# Patient Record
Sex: MALE | Race: WHITE | NOT HISPANIC OR LATINO
[De-identification: names, ages, dates, MRNs, and addresses within clinical notes are randomized per-mention and may not be internally consistent; named-entity substitution may affect disease eponyms.]

---

## 2017-06-22 ENCOUNTER — APPOINTMENT (OUTPATIENT)
Dept: HEART AND VASCULAR | Facility: CLINIC | Age: 52
End: 2017-06-22

## 2017-06-22 VITALS
RESPIRATION RATE: 12 BRPM | SYSTOLIC BLOOD PRESSURE: 132 MMHG | DIASTOLIC BLOOD PRESSURE: 82 MMHG | BODY MASS INDEX: 26.32 KG/M2 | TEMPERATURE: 98.2 F | OXYGEN SATURATION: 98 % | HEIGHT: 71 IN | HEART RATE: 93 BPM | WEIGHT: 188.04 LBS

## 2017-06-29 RX ORDER — LITHIUM CARBONATE 300 MG/1
300 TABLET, FILM COATED, EXTENDED RELEASE ORAL
Qty: 30 | Refills: 0 | Status: ACTIVE | COMMUNITY
Start: 2017-01-25

## 2017-06-30 PROBLEM — Z00.00 ENCOUNTER FOR PREVENTIVE HEALTH EXAMINATION: Noted: 2017-06-30

## 2017-07-24 ENCOUNTER — FORM ENCOUNTER (OUTPATIENT)
Age: 52
End: 2017-07-24

## 2017-07-25 ENCOUNTER — APPOINTMENT (OUTPATIENT)
Dept: ORTHOPEDIC SURGERY | Facility: CLINIC | Age: 52
End: 2017-07-25

## 2017-07-25 ENCOUNTER — OUTPATIENT (OUTPATIENT)
Dept: OUTPATIENT SERVICES | Facility: HOSPITAL | Age: 52
LOS: 1 days | End: 2017-07-25
Payer: MEDICAID

## 2017-07-25 DIAGNOSIS — R22.2 LOCALIZED SWELLING, MASS AND LUMP, TRUNK: ICD-10-CM

## 2017-07-25 PROCEDURE — 72082 X-RAY EXAM ENTIRE SPI 2/3 VW: CPT

## 2017-07-25 PROCEDURE — 72100 X-RAY EXAM L-S SPINE 2/3 VWS: CPT

## 2017-07-25 PROCEDURE — 72082 X-RAY EXAM ENTIRE SPI 2/3 VW: CPT | Mod: 26

## 2017-08-22 ENCOUNTER — APPOINTMENT (OUTPATIENT)
Dept: ORTHOPEDIC SURGERY | Facility: CLINIC | Age: 52
End: 2017-08-22
Payer: MEDICAID

## 2017-08-22 VITALS — WEIGHT: 188 LBS | BODY MASS INDEX: 26.32 KG/M2 | HEIGHT: 71 IN

## 2017-08-22 PROCEDURE — 99215 OFFICE O/P EST HI 40 MIN: CPT

## 2017-08-22 RX ORDER — MELOXICAM 15 MG/1
15 TABLET ORAL DAILY
Qty: 7 | Refills: 0 | Status: DISCONTINUED | COMMUNITY
Start: 2017-06-29 | End: 2017-08-22

## 2017-08-22 RX ORDER — AMOXICILLIN 500 MG/1
500 CAPSULE ORAL
Qty: 21 | Refills: 0 | Status: DISCONTINUED | COMMUNITY
Start: 2017-07-13 | End: 2017-08-22

## 2018-06-05 ENCOUNTER — APPOINTMENT (OUTPATIENT)
Dept: HEART AND VASCULAR | Facility: CLINIC | Age: 53
End: 2018-06-05
Payer: MEDICAID

## 2018-06-05 VITALS
SYSTOLIC BLOOD PRESSURE: 130 MMHG | DIASTOLIC BLOOD PRESSURE: 80 MMHG | RESPIRATION RATE: 12 BRPM | BODY MASS INDEX: 25.06 KG/M2 | OXYGEN SATURATION: 91 % | WEIGHT: 179 LBS | HEART RATE: 100 BPM | HEIGHT: 71 IN | TEMPERATURE: 98 F

## 2018-06-05 DIAGNOSIS — R00.0 TACHYCARDIA, UNSPECIFIED: ICD-10-CM

## 2018-06-05 DIAGNOSIS — M54.41 LUMBAGO WITH SCIATICA, RIGHT SIDE: ICD-10-CM

## 2018-06-05 PROCEDURE — 36415 COLL VENOUS BLD VENIPUNCTURE: CPT

## 2018-06-05 PROCEDURE — 99396 PREV VISIT EST AGE 40-64: CPT | Mod: 25

## 2018-06-05 PROCEDURE — 93000 ELECTROCARDIOGRAM COMPLETE: CPT

## 2018-06-07 LAB
25(OH)D3 SERPL-MCNC: 75.8 NG/ML
ALBUMIN SERPL ELPH-MCNC: 4.3 G/DL
ALP BLD-CCNC: 80 U/L
ALT SERPL-CCNC: 16 U/L
ANION GAP SERPL CALC-SCNC: 13 MMOL/L
APPEARANCE: CLEAR
AST SERPL-CCNC: 18 U/L
BASOPHILS # BLD AUTO: 0.02 K/UL
BASOPHILS NFR BLD AUTO: 0.2 %
BILIRUB SERPL-MCNC: 0.2 MG/DL
BILIRUBIN URINE: NEGATIVE
BLOOD URINE: NEGATIVE
BUN SERPL-MCNC: 17 MG/DL
CALCIUM SERPL-MCNC: 9.2 MG/DL
CHLORIDE SERPL-SCNC: 95 MMOL/L
CHOLEST SERPL-MCNC: 138 MG/DL
CHOLEST/HDLC SERPL: 2.6 RATIO
CO2 SERPL-SCNC: 30 MMOL/L
COLOR: YELLOW
CREAT SERPL-MCNC: 0.99 MG/DL
CRP SERPL HS-MCNC: 30.1 MG/L
EOSINOPHIL # BLD AUTO: 0.02 K/UL
EOSINOPHIL NFR BLD AUTO: 0.2 %
FOLATE SERPL-MCNC: >20 NG/ML
GLUCOSE QUALITATIVE U: NEGATIVE MG/DL
GLUCOSE SERPL-MCNC: 121 MG/DL
HCT VFR BLD CALC: 42.5 %
HDLC SERPL-MCNC: 53 MG/DL
HGB BLD-MCNC: 13.6 G/DL
IMM GRANULOCYTES NFR BLD AUTO: 0.2 %
KETONES URINE: NEGATIVE
LDLC SERPL CALC-MCNC: 78 MG/DL
LEUKOCYTE ESTERASE URINE: NEGATIVE
LYMPHOCYTES # BLD AUTO: 0.77 K/UL
LYMPHOCYTES NFR BLD AUTO: 8 %
MAGNESIUM SERPL-MCNC: 1.7 MG/DL
MAN DIFF?: NORMAL
MCHC RBC-ENTMCNC: 26.6 PG
MCHC RBC-ENTMCNC: 32 GM/DL
MCV RBC AUTO: 83.2 FL
MONOCYTES # BLD AUTO: 0.89 K/UL
MONOCYTES NFR BLD AUTO: 9.2 %
NEUTROPHILS # BLD AUTO: 7.91 K/UL
NEUTROPHILS NFR BLD AUTO: 82.2 %
NITRITE URINE: NEGATIVE
PH URINE: 8
PLATELET # BLD AUTO: 159 K/UL
POTASSIUM SERPL-SCNC: 4.5 MMOL/L
PROLACTIN SERPL-MCNC: 14.6 NG/ML
PROT SERPL-MCNC: 6.9 G/DL
PROTEIN URINE: NEGATIVE MG/DL
RBC # BLD: 5.11 M/UL
RBC # FLD: 13.8 %
SODIUM SERPL-SCNC: 138 MMOL/L
SPECIFIC GRAVITY URINE: 1.01
TRIGL SERPL-MCNC: 37 MG/DL
TSH SERPL-ACNC: 0.56 UIU/ML
UROBILINOGEN URINE: NEGATIVE MG/DL
VIT B12 SERPL-MCNC: 984 PG/ML
WBC # FLD AUTO: 9.63 K/UL

## 2018-06-08 LAB
TESTOST BND SERPL-MCNC: 3.1 PG/ML
TESTOST SERPL-MCNC: 177.8 NG/DL

## 2018-06-27 ENCOUNTER — APPOINTMENT (OUTPATIENT)
Dept: INTERNAL MEDICINE | Facility: CLINIC | Age: 53
End: 2018-06-27
Payer: MEDICAID

## 2018-06-27 ENCOUNTER — LABORATORY RESULT (OUTPATIENT)
Age: 53
End: 2018-06-27

## 2018-06-27 ENCOUNTER — APPOINTMENT (OUTPATIENT)
Dept: ORTHOPEDIC SURGERY | Facility: CLINIC | Age: 53
End: 2018-06-27
Payer: MEDICAID

## 2018-06-27 PROCEDURE — 36415 COLL VENOUS BLD VENIPUNCTURE: CPT

## 2018-06-27 PROCEDURE — 99215 OFFICE O/P EST HI 40 MIN: CPT

## 2018-07-10 ENCOUNTER — APPOINTMENT (OUTPATIENT)
Dept: MRI IMAGING | Facility: CLINIC | Age: 53
End: 2018-07-10
Payer: MEDICAID

## 2018-07-10 ENCOUNTER — OUTPATIENT (OUTPATIENT)
Dept: OUTPATIENT SERVICES | Facility: HOSPITAL | Age: 53
LOS: 1 days | End: 2018-07-10
Payer: MEDICAID

## 2018-07-10 PROCEDURE — 72148 MRI LUMBAR SPINE W/O DYE: CPT

## 2018-07-10 PROCEDURE — 72148 MRI LUMBAR SPINE W/O DYE: CPT | Mod: 26

## 2018-07-11 ENCOUNTER — APPOINTMENT (OUTPATIENT)
Dept: ORTHOPEDIC SURGERY | Facility: CLINIC | Age: 53
End: 2018-07-11

## 2018-07-26 ENCOUNTER — MOBILE ON CALL (OUTPATIENT)
Age: 53
End: 2018-07-26

## 2018-07-26 ENCOUNTER — RESULT REVIEW (OUTPATIENT)
Age: 53
End: 2018-07-26

## 2018-08-10 ENCOUNTER — APPOINTMENT (OUTPATIENT)
Dept: ORTHOPEDIC SURGERY | Facility: HOSPITAL | Age: 53
End: 2018-08-10
Payer: MEDICAID

## 2018-08-10 DIAGNOSIS — M43.16 SPONDYLOLISTHESIS, LUMBAR REGION: ICD-10-CM

## 2018-08-10 DIAGNOSIS — M54.16 SPINAL STENOSIS, LUMBAR REGION WITHOUT NEUROGENIC CLAUDICATION: ICD-10-CM

## 2018-08-10 DIAGNOSIS — M48.061 SPINAL STENOSIS, LUMBAR REGION WITHOUT NEUROGENIC CLAUDICATION: ICD-10-CM

## 2018-08-10 PROCEDURE — 99215 OFFICE O/P EST HI 40 MIN: CPT

## 2018-08-13 ENCOUNTER — OUTPATIENT (OUTPATIENT)
Dept: OUTPATIENT SERVICES | Facility: HOSPITAL | Age: 53
LOS: 1 days | End: 2018-08-13
Payer: COMMERCIAL

## 2018-08-13 DIAGNOSIS — Z22.321 CARRIER OR SUSPECTED CARRIER OF METHICILLIN SUSCEPTIBLE STAPHYLOCOCCUS AUREUS: ICD-10-CM

## 2018-08-13 PROCEDURE — 87641 MR-STAPH DNA AMP PROBE: CPT

## 2018-08-20 ENCOUNTER — MOBILE ON CALL (OUTPATIENT)
Age: 53
End: 2018-08-20

## 2018-08-20 DIAGNOSIS — Z22.321 CARRIER OR SUSPECTED CARRIER OF METHICILLIN SUSCEPTIBLE STAPHYLOCOCCUS AUREUS: ICD-10-CM

## 2018-08-22 ENCOUNTER — APPOINTMENT (OUTPATIENT)
Dept: HEART AND VASCULAR | Facility: CLINIC | Age: 53
End: 2018-08-22
Payer: MEDICAID

## 2018-08-22 VITALS
DIASTOLIC BLOOD PRESSURE: 110 MMHG | RESPIRATION RATE: 12 BRPM | HEART RATE: 91 BPM | BODY MASS INDEX: 25.06 KG/M2 | TEMPERATURE: 98 F | HEIGHT: 71 IN | WEIGHT: 179 LBS | SYSTOLIC BLOOD PRESSURE: 170 MMHG | OXYGEN SATURATION: 98 %

## 2018-08-22 PROCEDURE — 93000 ELECTROCARDIOGRAM COMPLETE: CPT

## 2018-08-22 PROCEDURE — 99214 OFFICE O/P EST MOD 30 MIN: CPT | Mod: 25

## 2018-08-22 PROCEDURE — 36415 COLL VENOUS BLD VENIPUNCTURE: CPT

## 2018-08-24 LAB
ALBUMIN SERPL ELPH-MCNC: 4.6 G/DL
ALP BLD-CCNC: 75 U/L
ALT SERPL-CCNC: 19 U/L
ANION GAP SERPL CALC-SCNC: 12 MMOL/L
APPEARANCE: CLEAR
APTT BLD: 36.5 SEC
AST SERPL-CCNC: 21 U/L
BASOPHILS # BLD AUTO: 0.07 K/UL
BASOPHILS NFR BLD AUTO: 1.1 %
BILIRUB SERPL-MCNC: 0.2 MG/DL
BILIRUBIN URINE: NEGATIVE
BLOOD URINE: NEGATIVE
BUN SERPL-MCNC: 15 MG/DL
CALCIUM SERPL-MCNC: 9.7 MG/DL
CHLORIDE SERPL-SCNC: 98 MMOL/L
CO2 SERPL-SCNC: 33 MMOL/L
COLOR: YELLOW
CREAT SERPL-MCNC: 0.91 MG/DL
EOSINOPHIL # BLD AUTO: 0.25 K/UL
EOSINOPHIL NFR BLD AUTO: 4 %
GLUCOSE QUALITATIVE U: NEGATIVE MG/DL
GLUCOSE SERPL-MCNC: 93 MG/DL
HCT VFR BLD CALC: 42 %
HGB BLD-MCNC: 13 G/DL
IMM GRANULOCYTES NFR BLD AUTO: 0.2 %
INR PPP: 0.95 RATIO
KETONES URINE: NEGATIVE
LEUKOCYTE ESTERASE URINE: NEGATIVE
LYMPHOCYTES # BLD AUTO: 2.74 K/UL
LYMPHOCYTES NFR BLD AUTO: 44.2 %
MAN DIFF?: NORMAL
MCHC RBC-ENTMCNC: 26.4 PG
MCHC RBC-ENTMCNC: 31 GM/DL
MCV RBC AUTO: 85.2 FL
MONOCYTES # BLD AUTO: 0.7 K/UL
MONOCYTES NFR BLD AUTO: 11.3 %
NEUTROPHILS # BLD AUTO: 2.43 K/UL
NEUTROPHILS NFR BLD AUTO: 39.2 %
NITRITE URINE: NEGATIVE
PH URINE: 8.5
PLATELET # BLD AUTO: 227 K/UL
POTASSIUM SERPL-SCNC: 4.9 MMOL/L
PROT SERPL-MCNC: 7.2 G/DL
PROTEIN URINE: NEGATIVE MG/DL
PT BLD: 10.7 SEC
RBC # BLD: 4.93 M/UL
RBC # FLD: 15.9 %
SODIUM SERPL-SCNC: 143 MMOL/L
SPECIFIC GRAVITY URINE: 1.01
UROBILINOGEN URINE: NEGATIVE MG/DL
WBC # FLD AUTO: 6.2 K/UL

## 2018-08-27 PROBLEM — Z22.321 MSSA (METHICILLIN-SUSCEPTIBLE STAPHYLOCOCCUS AUREUS) COLONIZATION: Status: ACTIVE | Noted: 2018-08-27

## 2018-08-27 RX ORDER — MUPIROCIN 20 MG/G
2 OINTMENT TOPICAL
Qty: 1 | Refills: 0 | Status: ACTIVE | COMMUNITY
Start: 2018-08-27 | End: 1900-01-01

## 2018-08-28 ENCOUNTER — FORM ENCOUNTER (OUTPATIENT)
Age: 53
End: 2018-08-28

## 2018-08-29 ENCOUNTER — APPOINTMENT (OUTPATIENT)
Dept: RADIOLOGY | Facility: CLINIC | Age: 53
End: 2018-08-29
Payer: MEDICAID

## 2018-08-29 ENCOUNTER — APPOINTMENT (OUTPATIENT)
Dept: CT IMAGING | Facility: CLINIC | Age: 53
End: 2018-08-29
Payer: MEDICAID

## 2018-08-29 ENCOUNTER — OUTPATIENT (OUTPATIENT)
Dept: OUTPATIENT SERVICES | Facility: HOSPITAL | Age: 53
LOS: 1 days | End: 2018-08-29
Payer: MEDICAID

## 2018-08-29 PROCEDURE — 71046 X-RAY EXAM CHEST 2 VIEWS: CPT | Mod: 26

## 2018-08-29 PROCEDURE — 72131 CT LUMBAR SPINE W/O DYE: CPT | Mod: 26

## 2018-08-29 PROCEDURE — 72131 CT LUMBAR SPINE W/O DYE: CPT

## 2018-08-29 PROCEDURE — 71046 X-RAY EXAM CHEST 2 VIEWS: CPT

## 2018-09-03 LAB
TESTOST BND SERPL-MCNC: 4.7 PG/ML
TESTOST SERPL-MCNC: 547.8 NG/DL

## 2018-09-19 VITALS
HEART RATE: 84 BPM | WEIGHT: 179.9 LBS | TEMPERATURE: 98 F | SYSTOLIC BLOOD PRESSURE: 153 MMHG | OXYGEN SATURATION: 97 % | HEIGHT: 71 IN | RESPIRATION RATE: 16 BRPM | DIASTOLIC BLOOD PRESSURE: 90 MMHG

## 2018-09-20 ENCOUNTER — APPOINTMENT (OUTPATIENT)
Dept: ORTHOPEDIC SURGERY | Facility: HOSPITAL | Age: 53
End: 2018-09-20

## 2018-09-20 ENCOUNTER — INPATIENT (INPATIENT)
Facility: HOSPITAL | Age: 53
LOS: 5 days | Discharge: HOME | DRG: 460 | End: 2018-09-26
Attending: ORTHOPAEDIC SURGERY | Admitting: ORTHOPAEDIC SURGERY
Payer: MEDICAID

## 2018-09-20 DIAGNOSIS — Z98.890 OTHER SPECIFIED POSTPROCEDURAL STATES: Chronic | ICD-10-CM

## 2018-09-20 DIAGNOSIS — M51.26 OTHER INTERVERTEBRAL DISC DISPLACEMENT, LUMBAR REGION: ICD-10-CM

## 2018-09-20 LAB
ANION GAP SERPL CALC-SCNC: 12 MMOL/L — SIGNIFICANT CHANGE UP (ref 5–17)
BASOPHILS NFR BLD AUTO: 0.2 % — SIGNIFICANT CHANGE UP (ref 0–2)
BUN SERPL-MCNC: 17 MG/DL — SIGNIFICANT CHANGE UP (ref 7–23)
CALCIUM SERPL-MCNC: 9 MG/DL — SIGNIFICANT CHANGE UP (ref 8.4–10.5)
CHLORIDE SERPL-SCNC: 97 MMOL/L — SIGNIFICANT CHANGE UP (ref 96–108)
CO2 SERPL-SCNC: 27 MMOL/L — SIGNIFICANT CHANGE UP (ref 22–31)
CREAT SERPL-MCNC: 1.08 MG/DL — SIGNIFICANT CHANGE UP (ref 0.5–1.3)
GLUCOSE SERPL-MCNC: 129 MG/DL — HIGH (ref 70–99)
HCT VFR BLD CALC: 33.5 % — LOW (ref 39–50)
HGB BLD-MCNC: 11.3 G/DL — LOW (ref 13–17)
LYMPHOCYTES # BLD AUTO: 12.3 % — LOW (ref 13–44)
MCHC RBC-ENTMCNC: 27.1 PG — SIGNIFICANT CHANGE UP (ref 27–34)
MCHC RBC-ENTMCNC: 33.7 G/DL — SIGNIFICANT CHANGE UP (ref 32–36)
MCV RBC AUTO: 80.3 FL — SIGNIFICANT CHANGE UP (ref 80–100)
MONOCYTES NFR BLD AUTO: 3.4 % — SIGNIFICANT CHANGE UP (ref 2–14)
NEUTROPHILS NFR BLD AUTO: 84.1 % — HIGH (ref 43–77)
PLATELET # BLD AUTO: 167 K/UL — SIGNIFICANT CHANGE UP (ref 150–400)
POTASSIUM SERPL-MCNC: 4.8 MMOL/L — SIGNIFICANT CHANGE UP (ref 3.5–5.3)
POTASSIUM SERPL-SCNC: 4.8 MMOL/L — SIGNIFICANT CHANGE UP (ref 3.5–5.3)
RBC # BLD: 4.17 M/UL — LOW (ref 4.2–5.8)
RBC # FLD: 13.8 % — SIGNIFICANT CHANGE UP (ref 10.3–16.9)
SODIUM SERPL-SCNC: 136 MMOL/L — SIGNIFICANT CHANGE UP (ref 135–145)
WBC # BLD: 8.7 K/UL — SIGNIFICANT CHANGE UP (ref 3.8–10.5)
WBC # FLD AUTO: 8.7 K/UL — SIGNIFICANT CHANGE UP (ref 3.8–10.5)

## 2018-09-20 PROCEDURE — 22612 ARTHRD PST TQ 1NTRSPC LUMBAR: CPT

## 2018-09-20 PROCEDURE — 22840 INSERT SPINE FIXATION DEVICE: CPT

## 2018-09-20 PROCEDURE — 22840 INSERT SPINE FIXATION DEVICE: CPT | Mod: AS

## 2018-09-20 PROCEDURE — 63048 LAM FACETEC &FORAMOT EA ADDL: CPT

## 2018-09-20 PROCEDURE — 20939 BONE MARROW ASPIR BONE GRFG: CPT

## 2018-09-20 PROCEDURE — 72131 CT LUMBAR SPINE W/O DYE: CPT | Mod: 26

## 2018-09-20 PROCEDURE — 63048 LAM FACETEC &FORAMOT EA ADDL: CPT | Mod: AS

## 2018-09-20 PROCEDURE — 61783 SCAN PROC SPINAL: CPT | Mod: 59

## 2018-09-20 PROCEDURE — 63047 LAM FACETEC & FORAMOT LUMBAR: CPT | Mod: AS

## 2018-09-20 PROCEDURE — 63047 LAM FACETEC & FORAMOT LUMBAR: CPT

## 2018-09-20 PROCEDURE — 22612 ARTHRD PST TQ 1NTRSPC LUMBAR: CPT | Mod: AS

## 2018-09-20 RX ORDER — DEXTROSE 50 % IN WATER 50 %
25 SYRINGE (ML) INTRAVENOUS ONCE
Qty: 0 | Refills: 0 | Status: DISCONTINUED | OUTPATIENT
Start: 2018-09-20 | End: 2018-09-26

## 2018-09-20 RX ORDER — LITHIUM CARBONATE 300 MG/1
1 TABLET, EXTENDED RELEASE ORAL
Qty: 0 | Refills: 0 | COMMUNITY

## 2018-09-20 RX ORDER — METHOCARBAMOL 500 MG/1
750 TABLET, FILM COATED ORAL EVERY 8 HOURS
Qty: 0 | Refills: 0 | Status: DISCONTINUED | OUTPATIENT
Start: 2018-09-20 | End: 2018-09-23

## 2018-09-20 RX ORDER — BUPROPION HYDROCHLORIDE 150 MG/1
1 TABLET, EXTENDED RELEASE ORAL
Qty: 0 | Refills: 0 | COMMUNITY

## 2018-09-20 RX ORDER — LAMOTRIGINE 25 MG/1
1 TABLET, ORALLY DISINTEGRATING ORAL
Qty: 0 | Refills: 0 | COMMUNITY

## 2018-09-20 RX ORDER — GLUCAGON INJECTION, SOLUTION 0.5 MG/.1ML
1 INJECTION, SOLUTION SUBCUTANEOUS ONCE
Qty: 0 | Refills: 0 | Status: DISCONTINUED | OUTPATIENT
Start: 2018-09-20 | End: 2018-09-26

## 2018-09-20 RX ORDER — METFORMIN HYDROCHLORIDE 850 MG/1
1 TABLET ORAL
Qty: 0 | Refills: 0 | COMMUNITY

## 2018-09-20 RX ORDER — BUPROPION HYDROCHLORIDE 150 MG/1
150 TABLET, EXTENDED RELEASE ORAL AT BEDTIME
Qty: 0 | Refills: 0 | Status: DISCONTINUED | OUTPATIENT
Start: 2018-09-20 | End: 2018-09-26

## 2018-09-20 RX ORDER — DEXTROSE 50 % IN WATER 50 %
15 SYRINGE (ML) INTRAVENOUS ONCE
Qty: 0 | Refills: 0 | Status: DISCONTINUED | OUTPATIENT
Start: 2018-09-20 | End: 2018-09-26

## 2018-09-20 RX ORDER — LITHIUM CARBONATE 300 MG/1
300 TABLET, EXTENDED RELEASE ORAL DAILY
Qty: 0 | Refills: 0 | Status: DISCONTINUED | OUTPATIENT
Start: 2018-09-20 | End: 2018-09-21

## 2018-09-20 RX ORDER — ARMODAFINIL 200 MG/1
250 TABLET ORAL
Qty: 0 | Refills: 0 | Status: DISCONTINUED | OUTPATIENT
Start: 2018-09-20 | End: 2018-09-26

## 2018-09-20 RX ORDER — NALOXONE HYDROCHLORIDE 4 MG/.1ML
0.1 SPRAY NASAL
Qty: 0 | Refills: 0 | Status: DISCONTINUED | OUTPATIENT
Start: 2018-09-20 | End: 2018-09-26

## 2018-09-20 RX ORDER — FLUOXETINE HCL 10 MG
60 CAPSULE ORAL
Qty: 0 | Refills: 0 | COMMUNITY

## 2018-09-20 RX ORDER — CEFAZOLIN SODIUM 1 G
2000 VIAL (EA) INJECTION EVERY 8 HOURS
Qty: 0 | Refills: 0 | Status: COMPLETED | OUTPATIENT
Start: 2018-09-20 | End: 2018-09-21

## 2018-09-20 RX ORDER — DOCUSATE SODIUM 100 MG
100 CAPSULE ORAL THREE TIMES A DAY
Qty: 0 | Refills: 0 | Status: DISCONTINUED | OUTPATIENT
Start: 2018-09-20 | End: 2018-09-26

## 2018-09-20 RX ORDER — BUPROPION HYDROCHLORIDE 150 MG/1
300 TABLET, EXTENDED RELEASE ORAL DAILY
Qty: 0 | Refills: 0 | Status: DISCONTINUED | OUTPATIENT
Start: 2018-09-20 | End: 2018-09-20

## 2018-09-20 RX ORDER — BUPIVACAINE 13.3 MG/ML
20 INJECTION, SUSPENSION, LIPOSOMAL INFILTRATION ONCE
Qty: 0 | Refills: 0 | Status: DISCONTINUED | OUTPATIENT
Start: 2018-09-20 | End: 2018-09-26

## 2018-09-20 RX ORDER — SODIUM CHLORIDE 9 MG/ML
1000 INJECTION, SOLUTION INTRAVENOUS
Qty: 0 | Refills: 0 | Status: DISCONTINUED | OUTPATIENT
Start: 2018-09-20 | End: 2018-09-22

## 2018-09-20 RX ORDER — LIOTHYRONINE SODIUM 25 UG/1
1 TABLET ORAL
Qty: 0 | Refills: 0 | COMMUNITY

## 2018-09-20 RX ORDER — BUPROPION HYDROCHLORIDE 150 MG/1
300 TABLET, EXTENDED RELEASE ORAL DAILY
Qty: 0 | Refills: 0 | Status: DISCONTINUED | OUTPATIENT
Start: 2018-09-20 | End: 2018-09-26

## 2018-09-20 RX ORDER — HYDROMORPHONE HYDROCHLORIDE 2 MG/ML
30 INJECTION INTRAMUSCULAR; INTRAVENOUS; SUBCUTANEOUS
Qty: 0 | Refills: 0 | Status: DISCONTINUED | OUTPATIENT
Start: 2018-09-20 | End: 2018-09-22

## 2018-09-20 RX ORDER — SODIUM CHLORIDE 9 MG/ML
1000 INJECTION, SOLUTION INTRAVENOUS
Qty: 0 | Refills: 0 | Status: DISCONTINUED | OUTPATIENT
Start: 2018-09-20 | End: 2018-09-21

## 2018-09-20 RX ORDER — HYDROMORPHONE HYDROCHLORIDE 2 MG/ML
0.6 INJECTION INTRAMUSCULAR; INTRAVENOUS; SUBCUTANEOUS
Qty: 0 | Refills: 0 | Status: DISCONTINUED | OUTPATIENT
Start: 2018-09-20 | End: 2018-09-22

## 2018-09-20 RX ORDER — INSULIN LISPRO 100/ML
VIAL (ML) SUBCUTANEOUS
Qty: 0 | Refills: 0 | Status: DISCONTINUED | OUTPATIENT
Start: 2018-09-20 | End: 2018-09-26

## 2018-09-20 RX ORDER — DEXAMETHASONE 0.5 MG/5ML
4 ELIXIR ORAL EVERY 6 HOURS
Qty: 0 | Refills: 0 | Status: COMPLETED | OUTPATIENT
Start: 2018-09-20 | End: 2018-09-22

## 2018-09-20 RX ORDER — CEFAZOLIN SODIUM 1 G
2000 VIAL (EA) INJECTION EVERY 8 HOURS
Qty: 0 | Refills: 0 | Status: DISCONTINUED | OUTPATIENT
Start: 2018-09-20 | End: 2018-09-20

## 2018-09-20 RX ORDER — BUPROPION HYDROCHLORIDE 150 MG/1
150 TABLET, EXTENDED RELEASE ORAL AT BEDTIME
Qty: 0 | Refills: 0 | Status: DISCONTINUED | OUTPATIENT
Start: 2018-09-20 | End: 2018-09-20

## 2018-09-20 RX ORDER — FLUOXETINE HCL 10 MG
40 CAPSULE ORAL DAILY
Qty: 0 | Refills: 0 | Status: DISCONTINUED | OUTPATIENT
Start: 2018-09-20 | End: 2018-09-26

## 2018-09-20 RX ORDER — SENNA PLUS 8.6 MG/1
2 TABLET ORAL AT BEDTIME
Qty: 0 | Refills: 0 | Status: DISCONTINUED | OUTPATIENT
Start: 2018-09-20 | End: 2018-09-26

## 2018-09-20 RX ORDER — HYDROMORPHONE HYDROCHLORIDE 2 MG/ML
0.5 INJECTION INTRAMUSCULAR; INTRAVENOUS; SUBCUTANEOUS
Qty: 0 | Refills: 0 | Status: DISCONTINUED | OUTPATIENT
Start: 2018-09-20 | End: 2018-09-22

## 2018-09-20 RX ORDER — LAMOTRIGINE 25 MG/1
200 TABLET, ORALLY DISINTEGRATING ORAL DAILY
Qty: 0 | Refills: 0 | Status: DISCONTINUED | OUTPATIENT
Start: 2018-09-20 | End: 2018-09-26

## 2018-09-20 RX ORDER — BUPIVACAINE 13.3 MG/ML
20 INJECTION, SUSPENSION, LIPOSOMAL INFILTRATION ONCE
Qty: 0 | Refills: 0 | Status: DISCONTINUED | OUTPATIENT
Start: 2018-09-20 | End: 2018-09-20

## 2018-09-20 RX ORDER — DEXTROSE 50 % IN WATER 50 %
12.5 SYRINGE (ML) INTRAVENOUS ONCE
Qty: 0 | Refills: 0 | Status: DISCONTINUED | OUTPATIENT
Start: 2018-09-20 | End: 2018-09-26

## 2018-09-20 RX ORDER — INFLUENZA VIRUS VACCINE 15; 15; 15; 15 UG/.5ML; UG/.5ML; UG/.5ML; UG/.5ML
0.5 SUSPENSION INTRAMUSCULAR ONCE
Qty: 0 | Refills: 0 | Status: COMPLETED | OUTPATIENT
Start: 2018-09-20 | End: 2018-09-26

## 2018-09-20 RX ORDER — ONDANSETRON 8 MG/1
4 TABLET, FILM COATED ORAL EVERY 6 HOURS
Qty: 0 | Refills: 0 | Status: DISCONTINUED | OUTPATIENT
Start: 2018-09-20 | End: 2018-09-26

## 2018-09-20 RX ORDER — LIOTHYRONINE SODIUM 25 UG/1
50 TABLET ORAL DAILY
Qty: 0 | Refills: 0 | Status: DISCONTINUED | OUTPATIENT
Start: 2018-09-20 | End: 2018-09-26

## 2018-09-20 RX ADMIN — Medication 4 MILLIGRAM(S): at 18:31

## 2018-09-20 RX ADMIN — Medication 2000 MILLIGRAM(S): at 19:59

## 2018-09-20 RX ADMIN — HYDROMORPHONE HYDROCHLORIDE 0.5 MILLIGRAM(S): 2 INJECTION INTRAMUSCULAR; INTRAVENOUS; SUBCUTANEOUS at 19:31

## 2018-09-20 RX ADMIN — HYDROMORPHONE HYDROCHLORIDE 0.5 MILLIGRAM(S): 2 INJECTION INTRAMUSCULAR; INTRAVENOUS; SUBCUTANEOUS at 19:09

## 2018-09-20 RX ADMIN — HYDROMORPHONE HYDROCHLORIDE 30 MILLILITER(S): 2 INJECTION INTRAMUSCULAR; INTRAVENOUS; SUBCUTANEOUS at 19:13

## 2018-09-20 NOTE — BRIEF OPERATIVE NOTE - PRE-OP DX
Spinal stenosis of lumbar region with radiculopathy  09/20/2018    Active  Tanvi Gonzalez  Spondylolisthesis of lumbar region  09/20/2018    Active  Tanvi Gonzalez

## 2018-09-20 NOTE — H&P ADULT - NSHPPHYSICALEXAM_GEN_ALL_CORE
B/l LE: Back decreased ROM secondary to pain, sensation intact, DP 2+, brisk cap refill, EHL/FHL/TA/GS 5/5  Rest of PE per MD clearance

## 2018-09-20 NOTE — PROGRESS NOTE ADULT - SUBJECTIVE AND OBJECTIVE BOX
Ortho Post Op Check    Procedure: Lami L3-5, PSF L4-5  Surgeon: Anthony  Laterality:    Pt comfortable without complaints, pain controlled  Denies CP, SOB, N/V, numbness/tingling     Vital Signs Last 24 Hrs  T(C): 36.1 (09-20-18 @ 17:36), Max: 36.1 (09-20-18 @ 17:36)  T(F): 97 (09-20-18 @ 17:36), Max: 97 (09-20-18 @ 17:36)  HR: 82 (09-20-18 @ 20:00) (82 - 92)  BP: 124/79 (09-20-18 @ 20:00) (109/61 - 136/81)  BP(mean): 93 (09-20-18 @ 20:00) (79 - 97)  RR: 17 (09-20-18 @ 20:00) (10 - 17)  SpO2: 99% (09-20-18 @ 20:00) (94% - 100%)  AVSS    NAD, non labored respirations  Affected extremity:          Dressing: clean/dry/intact          Drains: 1         Sensation: [ x] intact to light touch  [ ] decreased                              Vascular: [x ] warm well perfused; capillary refill <3 seconds          Motor exam: [ x]         [ ] Upper extremity                   Clayton (c5)   Bi(c5/6)   WE(c6)   EE(c7)    (c8)                                                R           5              5            5             5             5                                               L            5              5            5             5            5         [x ] Lower extremity                   IP(L2)     Q(L3)     TA(L4)     EHL(L5)     GS(s1)                                                 R          5           5          5            5              5                                               L           5           5         5             5              5                            11.3   8.7   )-----------( 167      ( 20 Sep 2018 18:15 )             33.5   20 Sep 2018 18:15    136    |  97     |  17     ----------------------------<  129    4.8     |  27     |  1.08     Ca    9.0        20 Sep 2018 18:15        Post-op X-Ray: Implant in good position    A/P: 52yMale POD#0 s/p L3-5 Lami, L4-5 PSF  - Stable  - Pain Control  - DVT ppx: SCDs  - Post op abx: Ancef  - PT, WBS: WBAT    Ortho Pager [phone]

## 2018-09-20 NOTE — H&P ADULT - NSHPLABSRESULTS_GEN_ALL_CORE
Preop cbc, bmp, pt/inr, ptt, ua wnl; nasal swab +MSSA pt completed treatment  preop cxr wnl per clearance  preop ekg sinus tachy 105 per clearance   stress 2015 neg per clearance

## 2018-09-20 NOTE — BRIEF OPERATIVE NOTE - PROCEDURE
<<-----Click on this checkbox to enter Procedure Lumbar laminectomy with fusion using instrumentation  09/20/2018    Active  ANDREW

## 2018-09-20 NOTE — H&P ADULT - HISTORY OF PRESENT ILLNESS
52M c/o back pain worsened over time without improvement. Failed conservative treatments. States intermittent numbness, tingling radiating to thighs front and back. Ambulates without assist. Presents today for elective Lami L3-5, PSF L4-5.

## 2018-09-21 ENCOUNTER — TRANSCRIPTION ENCOUNTER (OUTPATIENT)
Age: 53
End: 2018-09-21

## 2018-09-21 LAB
ANION GAP SERPL CALC-SCNC: 13 MMOL/L — SIGNIFICANT CHANGE UP (ref 5–17)
BASOPHILS NFR BLD AUTO: 0.1 % — SIGNIFICANT CHANGE UP (ref 0–2)
BUN SERPL-MCNC: 14 MG/DL — SIGNIFICANT CHANGE UP (ref 7–23)
CALCIUM SERPL-MCNC: 9.3 MG/DL — SIGNIFICANT CHANGE UP (ref 8.4–10.5)
CHLORIDE SERPL-SCNC: 97 MMOL/L — SIGNIFICANT CHANGE UP (ref 96–108)
CO2 SERPL-SCNC: 29 MMOL/L — SIGNIFICANT CHANGE UP (ref 22–31)
CREAT SERPL-MCNC: 0.8 MG/DL — SIGNIFICANT CHANGE UP (ref 0.5–1.3)
GLUCOSE SERPL-MCNC: 146 MG/DL — HIGH (ref 70–99)
HBA1C BLD-MCNC: 5 % — SIGNIFICANT CHANGE UP (ref 4–5.6)
HCT VFR BLD CALC: 35.7 % — LOW (ref 39–50)
HGB BLD-MCNC: 12 G/DL — LOW (ref 13–17)
LYMPHOCYTES # BLD AUTO: 8.2 % — LOW (ref 13–44)
MCHC RBC-ENTMCNC: 27 PG — SIGNIFICANT CHANGE UP (ref 27–34)
MCHC RBC-ENTMCNC: 33.6 G/DL — SIGNIFICANT CHANGE UP (ref 32–36)
MCV RBC AUTO: 80.2 FL — SIGNIFICANT CHANGE UP (ref 80–100)
MONOCYTES NFR BLD AUTO: 7.4 % — SIGNIFICANT CHANGE UP (ref 2–14)
NEUTROPHILS NFR BLD AUTO: 84.3 % — HIGH (ref 43–77)
PLATELET # BLD AUTO: 180 K/UL — SIGNIFICANT CHANGE UP (ref 150–400)
POTASSIUM SERPL-MCNC: 4.4 MMOL/L — SIGNIFICANT CHANGE UP (ref 3.5–5.3)
POTASSIUM SERPL-SCNC: 4.4 MMOL/L — SIGNIFICANT CHANGE UP (ref 3.5–5.3)
RBC # BLD: 4.45 M/UL — SIGNIFICANT CHANGE UP (ref 4.2–5.8)
RBC # FLD: 13.8 % — SIGNIFICANT CHANGE UP (ref 10.3–16.9)
SODIUM SERPL-SCNC: 139 MMOL/L — SIGNIFICANT CHANGE UP (ref 135–145)
WBC # BLD: 12.8 K/UL — HIGH (ref 3.8–10.5)
WBC # FLD AUTO: 12.8 K/UL — HIGH (ref 3.8–10.5)

## 2018-09-21 RX ORDER — DEXTROSE MONOHYDRATE, SODIUM CHLORIDE, AND POTASSIUM CHLORIDE 50; .745; 4.5 G/1000ML; G/1000ML; G/1000ML
1000 INJECTION, SOLUTION INTRAVENOUS
Qty: 0 | Refills: 0 | Status: DISCONTINUED | OUTPATIENT
Start: 2018-09-21 | End: 2018-09-22

## 2018-09-21 RX ADMIN — Medication 30 MILLIGRAM(S): at 07:43

## 2018-09-21 RX ADMIN — LIOTHYRONINE SODIUM 50 MICROGRAM(S): 25 TABLET ORAL at 07:38

## 2018-09-21 RX ADMIN — SENNA PLUS 2 TABLET(S): 8.6 TABLET ORAL at 21:23

## 2018-09-21 RX ADMIN — Medication 4 MILLIGRAM(S): at 06:40

## 2018-09-21 RX ADMIN — Medication 40 MILLIGRAM(S): at 07:37

## 2018-09-21 RX ADMIN — LAMOTRIGINE 200 MILLIGRAM(S): 25 TABLET, ORALLY DISINTEGRATING ORAL at 07:37

## 2018-09-21 RX ADMIN — Medication 100 MILLIGRAM(S): at 21:23

## 2018-09-21 RX ADMIN — Medication 2000 MILLIGRAM(S): at 04:20

## 2018-09-21 RX ADMIN — Medication 30 MILLIGRAM(S): at 15:55

## 2018-09-21 RX ADMIN — Medication 4 MILLIGRAM(S): at 00:20

## 2018-09-21 RX ADMIN — Medication 100 MILLIGRAM(S): at 15:52

## 2018-09-21 RX ADMIN — Medication 4 MILLIGRAM(S): at 18:33

## 2018-09-21 RX ADMIN — DEXTROSE MONOHYDRATE, SODIUM CHLORIDE, AND POTASSIUM CHLORIDE 50 MILLILITER(S): 50; .745; 4.5 INJECTION, SOLUTION INTRAVENOUS at 10:22

## 2018-09-21 RX ADMIN — Medication 100 MILLIGRAM(S): at 06:40

## 2018-09-21 RX ADMIN — SODIUM CHLORIDE 100 MILLILITER(S): 9 INJECTION, SOLUTION INTRAVENOUS at 00:20

## 2018-09-21 RX ADMIN — BUPROPION HYDROCHLORIDE 300 MILLIGRAM(S): 150 TABLET, EXTENDED RELEASE ORAL at 07:37

## 2018-09-21 RX ADMIN — BUPROPION HYDROCHLORIDE 150 MILLIGRAM(S): 150 TABLET, EXTENDED RELEASE ORAL at 21:22

## 2018-09-21 RX ADMIN — Medication 4 MILLIGRAM(S): at 11:57

## 2018-09-21 NOTE — PROGRESS NOTE ADULT - SUBJECTIVE AND OBJECTIVE BOX
SUBJECTIVE: Patient seen and examined. 52M POD#1 s/p L3-5 Laminectomy, L4-5 PSF, dural repair. Pt compliant with HOB 10deg/1 pillow/bedrest. Denies headaches or visual changes. Pain endorsed in low back but controlled. No fever, chills, CP, SOB, N/V.      OBJECTIVE:     Vital Signs Last 24 Hrs  T(C): 36.4 (21 Sep 2018 08:45), Max: 36.6 (21 Sep 2018 04:59)  T(F): 97.5 (21 Sep 2018 08:45), Max: 97.8 (21 Sep 2018 04:59)  HR: 90 (21 Sep 2018 08:45) (78 - 92)  BP: 131/81 (21 Sep 2018 08:45) (109/61 - 138/82)  BP(mean): 107 (20 Sep 2018 20:20) (79 - 107)  RR: 16 (21 Sep 2018 08:45) (10 - 17)  SpO2: 100% (21 Sep 2018 08:45) (94% - 100%)    PE: 52M resting comfortably in bed in NAD, AAOx3. HOB 10 deg, 1 pillow.         Dressing: clean/dry/intact L-spine, 1 bile bag drain. Nagel         Sensation: intact to light touch distally         Motor exam: 5/5 EHL/FHL/GS/TA bilaterally         Toes warm, well-perfused; capillary refill < 3 seconds              I&O's Detail    20 Sep 2018 07:01  -  21 Sep 2018 07:00  --------------------------------------------------------  IN:    lactated ringers.: 1000 mL  Total IN: 1000 mL    OUT:    Drain: 295 mL    Indwelling Catheter - Urethral: 3975 mL  Total OUT: 4270 mL    Total NET: -3270 mL          LABS:                        12.0   12.8  )-----------( 180      ( 21 Sep 2018 06:52 )             35.7     09-21    139  |  97  |  14  ----------------------------<  146<H>  4.4   |  29  |  0.80    Ca    9.3      21 Sep 2018 06:51            MEDICATIONS:    armodafinil 250 milliGRAM(s) Oral before breakfast  buPROPion XL . 300 milliGRAM(s) Oral daily  buPROPion XL . 150 milliGRAM(s) Oral at bedtime  dextroamphetamine 30 milliGRAM(s) Oral <User Schedule>  FLUoxetine 40 milliGRAM(s) Oral daily  HYDROmorphone  Injectable 0.5 milliGRAM(s) IV Push every 3 hours PRN  HYDROmorphone PCA (1 mG/mL) 30 milliLiter(s) PCA Continuous PCA Continuous  HYDROmorphone PCA (1 mG/mL) Rescue Clinician Bolus 0.6 milliGRAM(s) IV Push every 1 hour PRN  lamoTRIgine 200 milliGRAM(s) Oral daily  methocarbamol 750 milliGRAM(s) Oral every 8 hours PRN  ondansetron Injectable 4 milliGRAM(s) IV Push every 6 hours PRN          ASSESSMENT AND PLAN:   52M POD#1 s/p L3-5 Laminectomy, L4-5 PSF, dural repair  -Stable  -Bedrest, HOB to 10 deg w/ 1 pillow -- until 9/22 0700. Side-to-side movement only  -D/c nagel at 12pm - TOV  -Drain remains (bile bag x1)  -Pain control - Dilaudid PCA per pain management c/s  -Diet as tolerated  -F/u labs  -Decadron 4mg q6hrs x 6 doses  -DVT PPX: SCDs  -Dispo: Pending

## 2018-09-21 NOTE — CONSULT NOTE ADULT - SUBJECTIVE AND OBJECTIVE BOX
Patient is a 52y old  Male who presents with a chief complaint of Back pain (21 Sep 2018 07:14)        HPI:  52M c/o back pain worsened over time without improvement. Failed conservative treatments. States intermittent numbness, tingling radiating to thighs front and back. Ambulates without assist. Presents today for elective Lami L3-5, PSF L4-5. (20 Sep 2018 10:58)     Back pain and radicular symptoms in the LE   s/p surgery      Allergies  No Known Allergies      Health Issues  M48.061 M54.16 M43.16  Handoff  MEWS Score  Prediabetes  Hypertension  Dyslipidemia  Anxiety and depression  ADHD  Hepatitis C  Spondylolisthesis of lumbar region  Spinal stenosis of lumbar region with radiculopathy  Spinal stenosis of lumbar region with radiculopathy  Spondylolisthesis of lumbar region  HNP (herniated nucleus pulposus), lumbar  Lumbar laminectomy with fusion using instrumentation  History of liver biopsy        FAMILY HISTORY:      MEDICATIONS  (STANDING):  armodafinil 250 milliGRAM(s) Oral before breakfast  BUpivacaine liposome 1.3% Injectable (no eMAR) 20 milliLiter(s) Local Injection once  buPROPion XL . 300 milliGRAM(s) Oral daily  buPROPion XL . 150 milliGRAM(s) Oral at bedtime  dexamethasone  Injectable 4 milliGRAM(s) IV Push every 6 hours  dextroamphetamine 30 milliGRAM(s) Oral <User Schedule>  dextrose 5%. 1000 milliLiter(s) (50 mL/Hr) IV Continuous <Continuous>  dextrose 50% Injectable 12.5 Gram(s) IV Push once  dextrose 50% Injectable 25 Gram(s) IV Push once  dextrose 50% Injectable 25 Gram(s) IV Push once  docusate sodium 100 milliGRAM(s) Oral three times a day  FLUoxetine 40 milliGRAM(s) Oral daily  HYDROmorphone PCA (1 mG/mL) 30 milliLiter(s) PCA Continuous PCA Continuous  influenza   Vaccine 0.5 milliLiter(s) IntraMuscular once  insulin lispro (HumaLOG) corrective regimen sliding scale   SubCutaneous Before meals and at bedtime  lamoTRIgine 200 milliGRAM(s) Oral daily  liothyronine 50 MICROGram(s) Oral daily  senna 2 Tablet(s) Oral at bedtime  sodium chloride 0.45% with potassium chloride 20 mEq/L 1000 milliLiter(s) (50 mL/Hr) IV Continuous <Continuous>    MEDICATIONS  (PRN):  dextrose 40% Gel 15 Gram(s) Oral once PRN Blood Glucose LESS THAN 70 milliGRAM(s)/deciliter  glucagon  Injectable 1 milliGRAM(s) IntraMuscular once PRN Glucose LESS THAN 70 milligrams/deciliter  HYDROmorphone  Injectable 0.5 milliGRAM(s) IV Push every 3 hours PRN Severe Pain (7 - 10)  HYDROmorphone PCA (1 mG/mL) Rescue Clinician Bolus 0.6 milliGRAM(s) IV Push every 1 hour PRN for Pain Scale GREATER THAN 6  methocarbamol 750 milliGRAM(s) Oral every 8 hours PRN Back Spasms  naloxone Injectable 0.1 milliGRAM(s) IV Push every 3 minutes PRN For ANY of the following changes in patient status:  A. RR LESS THAN 10 breaths per minute, B. Oxygen saturation LESS THAN 90%, C. Sedation score of 6  ondansetron Injectable 4 milliGRAM(s) IV Push every 6 hours PRN Nausea      PAST MEDICAL & SURGICAL HISTORY:  Prediabetes  Hypertension  Dyslipidemia  Anxiety and depression  ADHD  Hepatitis C  History of liver biopsy      Labs                          12.0   12.8  )-----------( 180      ( 21 Sep 2018 06:52 )             35.7     09-21    139  |  97  |  14  ----------------------------<  146<H>  4.4   |  29  |  0.80    Ca    9.3      21 Sep 2018 06:51        Radiology:    Physical Exam    MENTAL STATUS  -Level of Consciousness- awake    Orientation- person, place time  Language- aphasia/ dysarthria- nl  Memory- recent and remote- nl      Cranial Nerve 1- 12  Pupils- equal and reactive  Eye movements-  full  Facial - no asymmetry   Lower CN-nl    Gait and Station-n/a    MOTOR  Upper-nl  Lower- no foot drop    Reflexes- intact    Sensation- no sensory level    Cerebellar- no tremors    vascular - no bruits    Assessment- Lumbar radiculopathy    Plan As per Ortho

## 2018-09-21 NOTE — DISCHARGE NOTE ADULT - HAS THE PATIENT RECEIVED THE INFLUENZA VACCINE THIS SEASON?
Health Care Summary completed 6/19/2017 printed and mailed to address listed in demographics.     Lisa Olivares        
Reason for Call:  Form, our goal is to have forms completed with 72 hours, however, some forms may require a visit or additional information.    Type of letter, form or note:  Health Care Summary    Who is the form from?: School (if other please explain)    Where did the form come from: Patient or family brought in       What clinic location was the form placed at[facility]    Where the form was placed: 's Box    What number is listed as a contact on the form?: [phone]       Additional comments: Mom would like them mailed to address on file.    5 of 6      Thank you!  Maddie KHALIL  Patient Representative  [facility]    Call taken on 1/18/2018 at 9:36 AM by Maddie Goodman    
yes...

## 2018-09-21 NOTE — DISCHARGE NOTE ADULT - ADDITIONAL INSTRUCTIONS
No strenuous activity (bending/twisting), heavy lifting, driving or returning to work until cleared by MD.  Change dressing daily with gauze/tape till post-op day #5, then leave incision open to air.  You may shower post-op day#5, keep incision clean and dry.   Try to have regular bowel movements, take stool softener or laxative if necessary.  May take pepcid or zantac for upset stomach.  May apply ice to affected areas to decrease swelling.  Call to schedule an appt with Dr. Cruz for follow up, if you have staples or sutures they will be removed in office.  Contact your doctor if you experience: fever greater than 101.5, chills, chest pain, difficulty breathing, redness or excessive drainage around the incision, other concerns.   Please follow up with your primary care provider. No strenuous activity (bending/twisting), heavy lifting, driving or returning to work until cleared by MD.  Change dressing daily with gauze/tape till post-op day #5, then leave incision open to air.  You may shower post-op day#5, keep incision clean and dry.   Try to have regular bowel movements, take stool softener or laxative if necessary.  May take pepcid or zantac for upset stomach.  May apply ice to affected areas to decrease swelling.  Call to schedule an appt with Dr. Cruz for follow up, if you have staples or sutures they will be removed in office.  Contact your doctor if you experience: fever greater than 101.5, chills, chest pain, difficulty breathing, redness or excessive drainage around the incision, other concerns.   Please follow up with your primary care provider.  Please call Dr. Gutierrez's office upon discharge to schedule appointment to see her 1 week from today for pain management follow up.   You should take steroid taper for 4 days- Decadron 4mg tab every 6 hours for 4 doses, then every 8 hours for 3 doses, then every 12 hours for 2 doses, then every 24 hours for 1 dose.

## 2018-09-21 NOTE — DISCHARGE NOTE ADULT - CARE PLAN
Principal Discharge DX:	HNP (herniated nucleus pulposus), lumbar  Goal:	Improvement in pain and ambulation after surgery  Assessment and plan of treatment:	See below

## 2018-09-21 NOTE — DISCHARGE NOTE ADULT - PATIENT PORTAL LINK FT
You can access the [facility] Patient Portal, offered by [facility], by registering with the following website: [email or web address]

## 2018-09-21 NOTE — PROGRESS NOTE ADULT - SUBJECTIVE AND OBJECTIVE BOX
Ortho    No HA or dizzniess. Bed rest today.   Pt comfortable without complaints, pain controlled  Denies CP, SOB, N/V, numbness/tingling     Vital Signs Last 24 Hrs  T(C): 36.6 (21 Sep 2018 04:59), Max: 36.6 (21 Sep 2018 04:59)  T(F): 97.8 (21 Sep 2018 04:59), Max: 97.8 (21 Sep 2018 04:59)  HR: 80 (21 Sep 2018 04:59) (78 - 92)  BP: 125/73 (21 Sep 2018 04:59) (109/61 - 138/82)  BP(mean): 107 (20 Sep 2018 20:20) (79 - 107)  RR: 16 (21 Sep 2018 04:59) (10 - 17)  SpO2: 100% (21 Sep 2018 04:59) (94% - 100%)    NAD, non labored respirations  Affected extremity:          Dressing: clean/dry/intact          Drains: 1         Sensation: [ x] intact to light touch  [ ] decreased                              Vascular: [x ] warm well perfused; capillary refill <3 seconds          Motor exam: [ x]         [ ] Upper extremity                   Clayton (c5)   Bi(c5/6)   WE(c6)   EE(c7)    (c8)                                                R           5              5            5             5             5                                               L            5              5            5             5            5         [x ] Lower extremity                   IP(L2)     Q(L3)     TA(L4)     EHL(L5)     GS(s1)                                                 R          5           5          5            5              5                                               L           5           5         5             5              5                            11.3   8.7   )-----------( 167      ( 20 Sep 2018 18:15 )             33.5   20 Sep 2018 18:15    136    |  97     |  17     ----------------------------<  129    4.8     |  27     |  1.08     Ca    9.0        20 Sep 2018 18:15        A/P: 52yMale s/p L3-5 Lami, L4-5 PSF, c/b dural tear  - Bed rest until tomorrow   - Continue with decadron   - Stable  - Pain Control  - DVT ppx: SCDs  - Post op abx: Ancef  - PT, WBS: bedrest for today, then WBAT    Ortho Pager [phone]

## 2018-09-21 NOTE — DISCHARGE NOTE ADULT - MEDICATION SUMMARY - MEDICATIONS TO TAKE
I will START or STAY ON the medications listed below when I get home from the hospital:    dexamethasone 4 mg oral tablet  -- 1 tab(s) by mouth x 4 days as directed: 4mg tab every 6 hours x 1 day, then 1tab q8 hrs x 1 day, then 1tab q12 hrs x 1d, then 1tab q24hr x 1d  -- It is very important that you take or use this exactly as directed.  Do not skip doses or discontinue unless directed by your doctor.  Obtain medical advice before taking any non-prescription drugs as some may affect the action of this medication.  Take with food or milk.    -- Indication: For Inflammation    HYDROmorphone 8 mg oral tablet  -- 1 tab(s) by mouth every 6 hours, As Needed -for severe pain MDD:4   -- Caution federal law prohibits the transfer of this drug to any person other  than the person for whom it was prescribed.  May cause drowsiness.  Alcohol may intensify this effect.  Use care when operating dangerous machinery.  This prescription cannot be refilled.  Using more of this medication than prescribed may cause serious breathing problems.    -- Indication: For Pain regimen per Dr. Gutierrez    pregabalin 100 mg oral capsule  -- 1 cap(s) by mouth every 8 hours MDD:3   -- Check with your doctor before becoming pregnant.  Do not drink alcoholic beverages when taking this medication.  May cause drowsiness or dizziness.  This drug may impair the ability to drive or operate machinery.  Use care until you become familiar with its effects.    -- Indication: For Pain regimen per Dr. Gutierrez    LaMICtal 200 mg oral tablet  -- 1 tab(s) by mouth once a day  -- Indication: For Home med    Valium 5 mg oral tablet  -- 1 tab(s) by mouth every 12 hours, As Needed - for muscle spasm MDD:2   -- Caution federal law prohibits the transfer of this drug to any person other  than the person for whom it was prescribed.  Do not take this drug if you are pregnant.  May cause drowsiness.  Alcohol may intensify this effect.  Use care when operating dangerous machinery.    -- Indication: For Pain regimen per Dr. Gutierrez    PROzac  -- 60 milligram(s) by mouth once a day  -- Indication: For Home med    metFORMIN 500 mg oral tablet  -- 1 tab(s) by mouth 3 times a day  -- Indication: For Home med    lithium 300 mg oral tablet  -- 1 tab(s) by mouth once a day  -- Indication: For Home med    Zenzedi 30 mg oral tablet  -- 1 tab(s) by mouth 2 times a day  -- Indication: For Home med    Wellbutrin  mg/24 hours oral tablet, extended release  -- 1 tab(s) by mouth every 24 hours  -- Indication: For Home med    Cytomel 50 mcg oral tablet  -- 1 tab(s) by mouth once a day  -- Indication: For Home med I will START or STAY ON the medications listed below when I get home from the hospital:    dexamethasone 4 mg oral tablet  -- 1 tab(s) by mouth x 4 days as directed: 4mg tab every 6 hours x 1 day, then 1tab q8 hrs x 1 day, then 1tab q12 hrs x 1d, then 1tab q24hr x 1d  -- It is very important that you take or use this exactly as directed.  Do not skip doses or discontinue unless directed by your doctor.  Obtain medical advice before taking any non-prescription drugs as some may affect the action of this medication.  Take with food or milk.    -- Indication: For Inflammation    HYDROmorphone 8 mg oral tablet  -- 1 tab(s) by mouth every 6 hours, As Needed -for severe pain MDD:4   -- Caution federal law prohibits the transfer of this drug to any person other  than the person for whom it was prescribed.  May cause drowsiness.  Alcohol may intensify this effect.  Use care when operating dangerous machinery.  This prescription cannot be refilled.  Using more of this medication than prescribed may cause serious breathing problems.    -- Indication: For Pain regimen per Dr. Gutierrez    gabapentin 600 mg oral tablet  -- 1 tab(s) by mouth 3 times a day   -- It is very important that you take or use this exactly as directed.  Do not skip doses or discontinue unless directed by your doctor.  May cause drowsiness.  Alcohol may intensify this effect.  Use care when operating dangerous machinery.    -- Indication: For Pain regimen by Dr Gutierrez    LaMICtal 200 mg oral tablet  -- 1 tab(s) by mouth once a day  -- Indication: For Home med    Valium 5 mg oral tablet  -- 1 tab(s) by mouth every 12 hours, As Needed - for muscle spasm MDD:2   -- Caution federal law prohibits the transfer of this drug to any person other  than the person for whom it was prescribed.  Do not take this drug if you are pregnant.  May cause drowsiness.  Alcohol may intensify this effect.  Use care when operating dangerous machinery.    -- Indication: For Pain regimen per Dr. Gutierrez    PROzac  -- 60 milligram(s) by mouth once a day  -- Indication: For Home med    metFORMIN 500 mg oral tablet  -- 1 tab(s) by mouth 3 times a day  -- Indication: For Home med    lithium 300 mg oral tablet  -- 1 tab(s) by mouth once a day  -- Indication: For Home med    Zenzedi 30 mg oral tablet  -- 1 tab(s) by mouth 2 times a day  -- Indication: For Home med    Wellbutrin  mg/24 hours oral tablet, extended release  -- 1 tab(s) by mouth every 24 hours  -- Indication: For Home med    Cytomel 50 mcg oral tablet  -- 1 tab(s) by mouth once a day  -- Indication: For Home med

## 2018-09-21 NOTE — DISCHARGE NOTE ADULT - HOSPITAL COURSE
Admitted  Surgery - L3-5 laminectomy, L4-5 PSF, dural repair  Eli-op Antibiotics  Pain control  DVT prophylaxis - SCDs  OOB/Physical Therapy Admitted  Surgery - L3-5 laminectomy, L4-5 PSF, dural repair  Eli-op Antibiotics  Pain control by Dr. Gutierrez, pain management  DVT prophylaxis - SCDs  OOB/Physical Therapy - cleared physical therapy on 9/26/18 AM

## 2018-09-22 LAB
ANION GAP SERPL CALC-SCNC: 10 MMOL/L — SIGNIFICANT CHANGE UP (ref 5–17)
BASOPHILS NFR BLD AUTO: 0.1 % — SIGNIFICANT CHANGE UP (ref 0–2)
BUN SERPL-MCNC: 14 MG/DL — SIGNIFICANT CHANGE UP (ref 7–23)
CALCIUM SERPL-MCNC: 9.1 MG/DL — SIGNIFICANT CHANGE UP (ref 8.4–10.5)
CHLORIDE SERPL-SCNC: 100 MMOL/L — SIGNIFICANT CHANGE UP (ref 96–108)
CO2 SERPL-SCNC: 28 MMOL/L — SIGNIFICANT CHANGE UP (ref 22–31)
CREAT SERPL-MCNC: 0.74 MG/DL — SIGNIFICANT CHANGE UP (ref 0.5–1.3)
EOSINOPHIL NFR BLD AUTO: 0 % — SIGNIFICANT CHANGE UP (ref 0–6)
GLUCOSE SERPL-MCNC: 128 MG/DL — HIGH (ref 70–99)
HCT VFR BLD CALC: 33.2 % — LOW (ref 39–50)
HGB BLD-MCNC: 11 G/DL — LOW (ref 13–17)
LYMPHOCYTES # BLD AUTO: 9.7 % — LOW (ref 13–44)
MCHC RBC-ENTMCNC: 26.8 PG — LOW (ref 27–34)
MCHC RBC-ENTMCNC: 33.1 G/DL — SIGNIFICANT CHANGE UP (ref 32–36)
MCV RBC AUTO: 81 FL — SIGNIFICANT CHANGE UP (ref 80–100)
MONOCYTES NFR BLD AUTO: 9.3 % — SIGNIFICANT CHANGE UP (ref 2–14)
NEUTROPHILS NFR BLD AUTO: 80.9 % — HIGH (ref 43–77)
PLATELET # BLD AUTO: 164 K/UL — SIGNIFICANT CHANGE UP (ref 150–400)
POTASSIUM SERPL-MCNC: 4.4 MMOL/L — SIGNIFICANT CHANGE UP (ref 3.5–5.3)
POTASSIUM SERPL-SCNC: 4.4 MMOL/L — SIGNIFICANT CHANGE UP (ref 3.5–5.3)
RBC # BLD: 4.1 M/UL — LOW (ref 4.2–5.8)
RBC # FLD: 13.8 % — SIGNIFICANT CHANGE UP (ref 10.3–16.9)
SODIUM SERPL-SCNC: 138 MMOL/L — SIGNIFICANT CHANGE UP (ref 135–145)
WBC # BLD: 15.9 K/UL — HIGH (ref 3.8–10.5)
WBC # FLD AUTO: 15.9 K/UL — HIGH (ref 3.8–10.5)

## 2018-09-22 RX ORDER — HYDROMORPHONE HYDROCHLORIDE 2 MG/ML
4 INJECTION INTRAMUSCULAR; INTRAVENOUS; SUBCUTANEOUS EVERY 4 HOURS
Qty: 0 | Refills: 0 | Status: DISCONTINUED | OUTPATIENT
Start: 2018-09-22 | End: 2018-09-22

## 2018-09-22 RX ORDER — HYDROMORPHONE HYDROCHLORIDE 2 MG/ML
1 INJECTION INTRAMUSCULAR; INTRAVENOUS; SUBCUTANEOUS EVERY 4 HOURS
Qty: 0 | Refills: 0 | Status: DISCONTINUED | OUTPATIENT
Start: 2018-09-22 | End: 2018-09-23

## 2018-09-22 RX ORDER — OXYCODONE AND ACETAMINOPHEN 5; 325 MG/1; MG/1
1 TABLET ORAL EVERY 4 HOURS
Qty: 0 | Refills: 0 | Status: DISCONTINUED | OUTPATIENT
Start: 2018-09-22 | End: 2018-09-23

## 2018-09-22 RX ORDER — ACETAMINOPHEN 500 MG
500 TABLET ORAL EVERY 6 HOURS
Qty: 0 | Refills: 0 | Status: DISCONTINUED | OUTPATIENT
Start: 2018-09-22 | End: 2018-09-23

## 2018-09-22 RX ORDER — HYDROMORPHONE HYDROCHLORIDE 2 MG/ML
0.5 INJECTION INTRAMUSCULAR; INTRAVENOUS; SUBCUTANEOUS ONCE
Qty: 0 | Refills: 0 | Status: DISCONTINUED | OUTPATIENT
Start: 2018-09-22 | End: 2018-09-22

## 2018-09-22 RX ORDER — CYCLOBENZAPRINE HYDROCHLORIDE 10 MG/1
10 TABLET, FILM COATED ORAL THREE TIMES A DAY
Qty: 0 | Refills: 0 | Status: DISCONTINUED | OUTPATIENT
Start: 2018-09-22 | End: 2018-09-23

## 2018-09-22 RX ORDER — HYDROMORPHONE HYDROCHLORIDE 2 MG/ML
0.5 INJECTION INTRAMUSCULAR; INTRAVENOUS; SUBCUTANEOUS EVERY 4 HOURS
Qty: 0 | Refills: 0 | Status: DISCONTINUED | OUTPATIENT
Start: 2018-09-22 | End: 2018-09-26

## 2018-09-22 RX ORDER — DIAZEPAM 5 MG
2 TABLET ORAL ONCE
Qty: 0 | Refills: 0 | Status: DISCONTINUED | OUTPATIENT
Start: 2018-09-22 | End: 2018-09-22

## 2018-09-22 RX ORDER — DIAZEPAM 5 MG
5 TABLET ORAL ONCE
Qty: 0 | Refills: 0 | Status: DISCONTINUED | OUTPATIENT
Start: 2018-09-22 | End: 2018-09-23

## 2018-09-22 RX ADMIN — Medication 30 MILLIGRAM(S): at 15:21

## 2018-09-22 RX ADMIN — Medication 4 MILLIGRAM(S): at 00:16

## 2018-09-22 RX ADMIN — Medication 30 MILLIGRAM(S): at 09:04

## 2018-09-22 RX ADMIN — OXYCODONE AND ACETAMINOPHEN 1 TABLET(S): 5; 325 TABLET ORAL at 17:48

## 2018-09-22 RX ADMIN — HYDROMORPHONE HYDROCHLORIDE 4 MILLIGRAM(S): 2 INJECTION INTRAMUSCULAR; INTRAVENOUS; SUBCUTANEOUS at 16:07

## 2018-09-22 RX ADMIN — HYDROMORPHONE HYDROCHLORIDE 4 MILLIGRAM(S): 2 INJECTION INTRAMUSCULAR; INTRAVENOUS; SUBCUTANEOUS at 11:51

## 2018-09-22 RX ADMIN — HYDROMORPHONE HYDROCHLORIDE 4 MILLIGRAM(S): 2 INJECTION INTRAMUSCULAR; INTRAVENOUS; SUBCUTANEOUS at 10:51

## 2018-09-22 RX ADMIN — LAMOTRIGINE 200 MILLIGRAM(S): 25 TABLET, ORALLY DISINTEGRATING ORAL at 12:04

## 2018-09-22 RX ADMIN — HYDROMORPHONE HYDROCHLORIDE 30 MILLILITER(S): 2 INJECTION INTRAMUSCULAR; INTRAVENOUS; SUBCUTANEOUS at 01:11

## 2018-09-22 RX ADMIN — Medication 2 MILLIGRAM(S): at 21:42

## 2018-09-22 RX ADMIN — BUPROPION HYDROCHLORIDE 300 MILLIGRAM(S): 150 TABLET, EXTENDED RELEASE ORAL at 12:04

## 2018-09-22 RX ADMIN — METHOCARBAMOL 750 MILLIGRAM(S): 500 TABLET, FILM COATED ORAL at 16:55

## 2018-09-22 RX ADMIN — HYDROMORPHONE HYDROCHLORIDE 4 MILLIGRAM(S): 2 INJECTION INTRAMUSCULAR; INTRAVENOUS; SUBCUTANEOUS at 19:25

## 2018-09-22 RX ADMIN — LIOTHYRONINE SODIUM 50 MICROGRAM(S): 25 TABLET ORAL at 09:04

## 2018-09-22 RX ADMIN — HYDROMORPHONE HYDROCHLORIDE 1 MILLIGRAM(S): 2 INJECTION INTRAMUSCULAR; INTRAVENOUS; SUBCUTANEOUS at 21:40

## 2018-09-22 RX ADMIN — HYDROMORPHONE HYDROCHLORIDE 0.5 MILLIGRAM(S): 2 INJECTION INTRAMUSCULAR; INTRAVENOUS; SUBCUTANEOUS at 16:20

## 2018-09-22 RX ADMIN — OXYCODONE AND ACETAMINOPHEN 1 TABLET(S): 5; 325 TABLET ORAL at 18:48

## 2018-09-22 RX ADMIN — Medication 40 MILLIGRAM(S): at 12:05

## 2018-09-22 RX ADMIN — HYDROMORPHONE HYDROCHLORIDE 0.5 MILLIGRAM(S): 2 INJECTION INTRAMUSCULAR; INTRAVENOUS; SUBCUTANEOUS at 16:55

## 2018-09-22 RX ADMIN — HYDROMORPHONE HYDROCHLORIDE 0.5 MILLIGRAM(S): 2 INJECTION INTRAMUSCULAR; INTRAVENOUS; SUBCUTANEOUS at 16:05

## 2018-09-22 RX ADMIN — SENNA PLUS 2 TABLET(S): 8.6 TABLET ORAL at 21:42

## 2018-09-22 RX ADMIN — Medication 100 MILLIGRAM(S): at 09:04

## 2018-09-22 RX ADMIN — HYDROMORPHONE HYDROCHLORIDE 4 MILLIGRAM(S): 2 INJECTION INTRAMUSCULAR; INTRAVENOUS; SUBCUTANEOUS at 20:17

## 2018-09-22 RX ADMIN — Medication 100 MILLIGRAM(S): at 21:42

## 2018-09-22 RX ADMIN — CYCLOBENZAPRINE HYDROCHLORIDE 10 MILLIGRAM(S): 10 TABLET, FILM COATED ORAL at 20:24

## 2018-09-22 RX ADMIN — Medication 100 MILLIGRAM(S): at 15:20

## 2018-09-22 RX ADMIN — HYDROMORPHONE HYDROCHLORIDE 0.5 MILLIGRAM(S): 2 INJECTION INTRAMUSCULAR; INTRAVENOUS; SUBCUTANEOUS at 16:30

## 2018-09-22 RX ADMIN — HYDROMORPHONE HYDROCHLORIDE 4 MILLIGRAM(S): 2 INJECTION INTRAMUSCULAR; INTRAVENOUS; SUBCUTANEOUS at 15:20

## 2018-09-22 RX ADMIN — HYDROMORPHONE HYDROCHLORIDE 1 MILLIGRAM(S): 2 INJECTION INTRAMUSCULAR; INTRAVENOUS; SUBCUTANEOUS at 21:10

## 2018-09-22 NOTE — PHYSICAL THERAPY INITIAL EVALUATION ADULT - ADDITIONAL COMMENTS
Does not own DME. Pt lives on the first floor of an apt building, no steps to enter. Everything on one level.

## 2018-09-22 NOTE — PROGRESS NOTE ADULT - SUBJECTIVE AND OBJECTIVE BOX
Ortho    No HA or dizziness  Pt comfortable without complaints, pain controlled  Denies CP, SOB, N/V, numbness/tingling     Vital Signs Last 24 Hrs  T(C): 36.9 (22 Sep 2018 04:59), Max: 36.9 (22 Sep 2018 04:59)  T(F): 98.5 (22 Sep 2018 04:59), Max: 98.5 (22 Sep 2018 04:59)  HR: 79 (22 Sep 2018 04:59) (79 - 103)  BP: 126/67 (22 Sep 2018 04:59) (119/64 - 143/68)  BP(mean): --  RR: 17 (22 Sep 2018 04:59) (15 - 18)  SpO2: 97% (22 Sep 2018 04:59) (95% - 100%)    NAD, non labored respirations  Affected extremity:          Dressing: clean/dry/intact          Drains: 1         Sensation: [ x] intact to light touch  [ ] decreased                              Vascular: [x ] warm well perfused; capillary refill <3 seconds          Motor exam: [ x]         [ ] Upper extremity                   Clayton (c5)   Bi(c5/6)   WE(c6)   EE(c7)    (c8)                                                R           5              5            5             5             5                                               L            5              5            5             5            5         [x ] Lower extremity                   IP(L2)     Q(L3)     TA(L4)     EHL(L5)     GS(s1)                                                 R          5           5          5            5              5                                               L           5           5         5             5              5                            11.3   8.7   )-----------( 167      ( 20 Sep 2018 18:15 )             33.5   20 Sep 2018 18:15    136    |  97     |  17     ----------------------------<  129    4.8     |  27     |  1.08     Ca    9.0        20 Sep 2018 18:15        A/P: 52yMale s/p L3-5 Lami, L4-5 PSF, c/b dural tear  - Allowed to sit in bed today if tolerable. If no headaches, can walk with PT.   - Stable  - Pain Control  - DVT ppx: SCDs  - Post op abx: Ancef  - PT, WBS: WBAT if no headaches, dizziniess     Ortho Pager [phone]

## 2018-09-22 NOTE — PROGRESS NOTE ADULT - SUBJECTIVE AND OBJECTIVE BOX
Orthopaedic Spine Attending Note    S: no acute overnight events.  Pain controlled.  No headaches.  No fevers/chills/shortness of breath/chest pain/new neurologic complaints.    O:  T(C): 36.9 (09-22-18 @ 04:59), Max: 36.9 (09-22-18 @ 04:59)  HR: 79 (09-22-18 @ 04:59) (79 - 103)  BP: 126/67 (09-22-18 @ 04:59) (119/64 - 143/68)  RR: 17 (09-22-18 @ 04:59) (15 - 18)  SpO2: 97% (09-22-18 @ 04:59) (95% - 100%)  Wt(kg): --  GEN: NAD, A and O x 3  Incision: Dressing mild serosanguinous drainage, changed by housestaff.  incision healing well without active drainage or sign of infection.    Motor:  5/5 L2-S1 bilaterally    Sensory:  Sensation intact to light touch L2-S1 bilaterally    Vascular:  No edema, calf tenderness, wwp, 2+ DP bilateral lower extremities    Labs:                        12.0   12.8  )-----------( 180      ( 21 Sep 2018 06:52 )             35.7     21 Sep 2018 06:51    139    |  97     |  14     ----------------------------<  146    4.4     |  29     |  0.80     Ca    9.3        21 Sep 2018 06:51        A/P: 52y Male POD #2 s/p L3-5 lami, L4/5 PSF, doing well  -may mobilize this AM, d/c bedrest  -remove drain and suture site with nylon  -d/c PCA and IVF, start oral pain meds  -mobilize, OOB  -PT  -pain control  -SCDs  -dispo: possible d/c tomorrow pending progress with PT

## 2018-09-22 NOTE — PROGRESS NOTE ADULT - SUBJECTIVE AND OBJECTIVE BOX
Neurology Follow up note    Name  ILANA CHEW    HPI:  52M c/o back pain worsened over time without improvement. Failed conservative treatments. States intermittent numbness, tingling radiating to thighs front and back. Ambulates without assist. Presents today for elective Lami L3-5, PSF L4-5. (20 Sep 2018 10:58)      Interval History - back pain- no new LE radicular symptoms        REVIEW OF SYSTEMS    Vital Signs Last 24 Hrs  T(C): 36.7 (22 Sep 2018 12:14), Max: 36.9 (22 Sep 2018 04:59)  T(F): 98.1 (22 Sep 2018 12:14), Max: 98.5 (22 Sep 2018 04:59)  HR: 86 (22 Sep 2018 12:14) (79 - 103)  BP: 133/77 (22 Sep 2018 12:14) (119/64 - 143/68)  BP(mean): 99 (22 Sep 2018 12:14) (99 - 99)  RR: 16 (22 Sep 2018 12:14) (15 - 18)  SpO2: 99% (22 Sep 2018 12:14) (95% - 100%)    Physical Exam-     Mental Status- awake and alert    Cranial Nerves-nl    Gait and station- no foot drop    Motor- nl    Reflexes-intact    Sensation- no sensory level    Coordination- no tremors    Vascular - no bruits    Medications  acetaminophen   Tablet .. 500 milliGRAM(s) Oral every 6 hours PRN  armodafinil 250 milliGRAM(s) Oral before breakfast  BUpivacaine liposome 1.3% Injectable (no eMAR) 20 milliLiter(s) Local Injection once  buPROPion XL . 300 milliGRAM(s) Oral daily  buPROPion XL . 150 milliGRAM(s) Oral at bedtime  cyclobenzaprine 10 milliGRAM(s) Oral three times a day PRN  dextroamphetamine 30 milliGRAM(s) Oral <User Schedule>  dextrose 40% Gel 15 Gram(s) Oral once PRN  dextrose 50% Injectable 12.5 Gram(s) IV Push once  dextrose 50% Injectable 25 Gram(s) IV Push once  dextrose 50% Injectable 25 Gram(s) IV Push once  docusate sodium 100 milliGRAM(s) Oral three times a day  FLUoxetine 40 milliGRAM(s) Oral daily  glucagon  Injectable 1 milliGRAM(s) IntraMuscular once PRN  HYDROmorphone   Tablet 4 milliGRAM(s) Oral every 4 hours PRN  influenza   Vaccine 0.5 milliLiter(s) IntraMuscular once  insulin lispro (HumaLOG) corrective regimen sliding scale   SubCutaneous Before meals and at bedtime  lamoTRIgine 200 milliGRAM(s) Oral daily  liothyronine 50 MICROGram(s) Oral daily  methocarbamol 750 milliGRAM(s) Oral every 8 hours PRN  naloxone Injectable 0.1 milliGRAM(s) IV Push every 3 minutes PRN  ondansetron Injectable 4 milliGRAM(s) IV Push every 6 hours PRN  oxyCODONE    5 mG/acetaminophen 325 mG 1 Tablet(s) Oral every 4 hours PRN  senna 2 Tablet(s) Oral at bedtime      Lab      Radiology    Assessment- Lumbar radiculopathy    Plan as per ortho

## 2018-09-23 RX ORDER — HYDROMORPHONE HYDROCHLORIDE 2 MG/ML
0.5 INJECTION INTRAMUSCULAR; INTRAVENOUS; SUBCUTANEOUS ONCE
Qty: 0 | Refills: 0 | Status: DISCONTINUED | OUTPATIENT
Start: 2018-09-23 | End: 2018-09-23

## 2018-09-23 RX ORDER — ACETAMINOPHEN 500 MG
650 TABLET ORAL EVERY 6 HOURS
Qty: 0 | Refills: 0 | Status: DISCONTINUED | OUTPATIENT
Start: 2018-09-23 | End: 2018-09-26

## 2018-09-23 RX ORDER — HYDROMORPHONE HYDROCHLORIDE 2 MG/ML
4 INJECTION INTRAMUSCULAR; INTRAVENOUS; SUBCUTANEOUS EVERY 4 HOURS
Qty: 0 | Refills: 0 | Status: DISCONTINUED | OUTPATIENT
Start: 2018-09-23 | End: 2018-09-24

## 2018-09-23 RX ORDER — HYDROMORPHONE HYDROCHLORIDE 2 MG/ML
2 INJECTION INTRAMUSCULAR; INTRAVENOUS; SUBCUTANEOUS EVERY 4 HOURS
Qty: 0 | Refills: 0 | Status: DISCONTINUED | OUTPATIENT
Start: 2018-09-23 | End: 2018-09-24

## 2018-09-23 RX ORDER — DIAZEPAM 5 MG
5 TABLET ORAL EVERY 8 HOURS
Qty: 0 | Refills: 0 | Status: DISCONTINUED | OUTPATIENT
Start: 2018-09-23 | End: 2018-09-26

## 2018-09-23 RX ORDER — MORPHINE SULFATE 50 MG/1
2 CAPSULE, EXTENDED RELEASE ORAL ONCE
Qty: 0 | Refills: 0 | Status: DISCONTINUED | OUTPATIENT
Start: 2018-09-23 | End: 2018-09-23

## 2018-09-23 RX ORDER — ACETAMINOPHEN 500 MG
1000 TABLET ORAL ONCE
Qty: 0 | Refills: 0 | Status: COMPLETED | OUTPATIENT
Start: 2018-09-23 | End: 2018-09-23

## 2018-09-23 RX ADMIN — Medication 400 MILLIGRAM(S): at 07:44

## 2018-09-23 RX ADMIN — HYDROMORPHONE HYDROCHLORIDE 2 MILLIGRAM(S): 2 INJECTION INTRAMUSCULAR; INTRAVENOUS; SUBCUTANEOUS at 08:12

## 2018-09-23 RX ADMIN — BUPROPION HYDROCHLORIDE 300 MILLIGRAM(S): 150 TABLET, EXTENDED RELEASE ORAL at 11:04

## 2018-09-23 RX ADMIN — Medication 650 MILLIGRAM(S): at 14:03

## 2018-09-23 RX ADMIN — Medication 650 MILLIGRAM(S): at 14:40

## 2018-09-23 RX ADMIN — HYDROMORPHONE HYDROCHLORIDE 0.5 MILLIGRAM(S): 2 INJECTION INTRAMUSCULAR; INTRAVENOUS; SUBCUTANEOUS at 17:13

## 2018-09-23 RX ADMIN — HYDROMORPHONE HYDROCHLORIDE 4 MILLIGRAM(S): 2 INJECTION INTRAMUSCULAR; INTRAVENOUS; SUBCUTANEOUS at 12:33

## 2018-09-23 RX ADMIN — BUPROPION HYDROCHLORIDE 150 MILLIGRAM(S): 150 TABLET, EXTENDED RELEASE ORAL at 21:08

## 2018-09-23 RX ADMIN — Medication 40 MILLIGRAM(S): at 11:03

## 2018-09-23 RX ADMIN — HYDROMORPHONE HYDROCHLORIDE 0.5 MILLIGRAM(S): 2 INJECTION INTRAMUSCULAR; INTRAVENOUS; SUBCUTANEOUS at 21:40

## 2018-09-23 RX ADMIN — HYDROMORPHONE HYDROCHLORIDE 1 MILLIGRAM(S): 2 INJECTION INTRAMUSCULAR; INTRAVENOUS; SUBCUTANEOUS at 07:08

## 2018-09-23 RX ADMIN — HYDROMORPHONE HYDROCHLORIDE 0.5 MILLIGRAM(S): 2 INJECTION INTRAMUSCULAR; INTRAVENOUS; SUBCUTANEOUS at 13:50

## 2018-09-23 RX ADMIN — MORPHINE SULFATE 2 MILLIGRAM(S): 50 CAPSULE, EXTENDED RELEASE ORAL at 06:00

## 2018-09-23 RX ADMIN — Medication 100 MILLIGRAM(S): at 11:03

## 2018-09-23 RX ADMIN — Medication 100 MILLIGRAM(S): at 17:13

## 2018-09-23 RX ADMIN — HYDROMORPHONE HYDROCHLORIDE 4 MILLIGRAM(S): 2 INJECTION INTRAMUSCULAR; INTRAVENOUS; SUBCUTANEOUS at 16:30

## 2018-09-23 RX ADMIN — HYDROMORPHONE HYDROCHLORIDE 1 MILLIGRAM(S): 2 INJECTION INTRAMUSCULAR; INTRAVENOUS; SUBCUTANEOUS at 06:30

## 2018-09-23 RX ADMIN — Medication 75 MILLIGRAM(S): at 11:03

## 2018-09-23 RX ADMIN — Medication 100 MILLIGRAM(S): at 21:08

## 2018-09-23 RX ADMIN — Medication 650 MILLIGRAM(S): at 22:00

## 2018-09-23 RX ADMIN — HYDROMORPHONE HYDROCHLORIDE 4 MILLIGRAM(S): 2 INJECTION INTRAMUSCULAR; INTRAVENOUS; SUBCUTANEOUS at 11:54

## 2018-09-23 RX ADMIN — HYDROMORPHONE HYDROCHLORIDE 4 MILLIGRAM(S): 2 INJECTION INTRAMUSCULAR; INTRAVENOUS; SUBCUTANEOUS at 20:03

## 2018-09-23 RX ADMIN — HYDROMORPHONE HYDROCHLORIDE 2 MILLIGRAM(S): 2 INJECTION INTRAMUSCULAR; INTRAVENOUS; SUBCUTANEOUS at 08:50

## 2018-09-23 RX ADMIN — HYDROMORPHONE HYDROCHLORIDE 0.5 MILLIGRAM(S): 2 INJECTION INTRAMUSCULAR; INTRAVENOUS; SUBCUTANEOUS at 21:08

## 2018-09-23 RX ADMIN — Medication 1000 MILLIGRAM(S): at 08:12

## 2018-09-23 RX ADMIN — HYDROMORPHONE HYDROCHLORIDE 1 MILLIGRAM(S): 2 INJECTION INTRAMUSCULAR; INTRAVENOUS; SUBCUTANEOUS at 02:29

## 2018-09-23 RX ADMIN — HYDROMORPHONE HYDROCHLORIDE 4 MILLIGRAM(S): 2 INJECTION INTRAMUSCULAR; INTRAVENOUS; SUBCUTANEOUS at 20:50

## 2018-09-23 RX ADMIN — HYDROMORPHONE HYDROCHLORIDE 1 MILLIGRAM(S): 2 INJECTION INTRAMUSCULAR; INTRAVENOUS; SUBCUTANEOUS at 02:57

## 2018-09-23 RX ADMIN — SENNA PLUS 2 TABLET(S): 8.6 TABLET ORAL at 21:08

## 2018-09-23 RX ADMIN — Medication 5 MILLIGRAM(S): at 12:40

## 2018-09-23 RX ADMIN — Medication 5 MILLIGRAM(S): at 04:19

## 2018-09-23 RX ADMIN — HYDROMORPHONE HYDROCHLORIDE 4 MILLIGRAM(S): 2 INJECTION INTRAMUSCULAR; INTRAVENOUS; SUBCUTANEOUS at 15:49

## 2018-09-23 RX ADMIN — HYDROMORPHONE HYDROCHLORIDE 0.5 MILLIGRAM(S): 2 INJECTION INTRAMUSCULAR; INTRAVENOUS; SUBCUTANEOUS at 14:05

## 2018-09-23 RX ADMIN — Medication 650 MILLIGRAM(S): at 21:08

## 2018-09-23 RX ADMIN — LAMOTRIGINE 200 MILLIGRAM(S): 25 TABLET, ORALLY DISINTEGRATING ORAL at 11:03

## 2018-09-23 RX ADMIN — LIOTHYRONINE SODIUM 50 MICROGRAM(S): 25 TABLET ORAL at 11:07

## 2018-09-23 RX ADMIN — MORPHINE SULFATE 2 MILLIGRAM(S): 50 CAPSULE, EXTENDED RELEASE ORAL at 05:31

## 2018-09-23 RX ADMIN — HYDROMORPHONE HYDROCHLORIDE 0.5 MILLIGRAM(S): 2 INJECTION INTRAMUSCULAR; INTRAVENOUS; SUBCUTANEOUS at 17:30

## 2018-09-23 RX ADMIN — Medication 5 MILLIGRAM(S): at 22:49

## 2018-09-23 NOTE — PROGRESS NOTE ADULT - SUBJECTIVE AND OBJECTIVE BOX
Ortho    No HA or dizziness  Pt in spastic pain this am. Plan to optimize pain meds.   Denies CP, SOB, N/V, numbness/tingling     Vital Signs Last 24 Hrs  T(C): 37.2 (23 Sep 2018 04:02), Max: 37.2 (23 Sep 2018 04:02)  T(F): 98.9 (23 Sep 2018 04:02), Max: 98.9 (23 Sep 2018 04:02)  HR: 89 (23 Sep 2018 06:22) (81 - 97)  BP: 123/78 (23 Sep 2018 06:22) (107/57 - 136/71)  BP(mean): 99 (22 Sep 2018 12:14) (99 - 99)  RR: 20 (23 Sep 2018 06:22) (16 - 20)  SpO2: 98% (23 Sep 2018 06:22) (95% - 99%)    NAD, non labored respirations  Affected extremity:          Dressing: clean/dry/intact; dsg changed this am          Sensation: [ x] intact to light touch  [ ] decreased                              Vascular: [x ] warm well perfused; capillary refill <3 seconds          Motor exam: [ x]         [ ] Upper extremity                   [place] (c5)   Bi(c5/6)   WE(c6)   EE(c7)    (c8)                                                R           5              5            5             5             5                                               L            5              5            5             5            5         [x ] Lower extremity                   IP(L2)     Q(L3)     TA(L4)     EHL(L5)     GS(s1)                                                 R          5           5          5            5              5                                               L           5           5         5             5              5                            11.3   8.7   )-----------( 167      ( 20 Sep 2018 18:15 )             33.5   20 Sep 2018 18:15    136    |  97     |  17     ----------------------------<  129    4.8     |  27     |  1.08     Ca    9.0        20 Sep 2018 18:15        A/P: 52yMale s/p L3-5 Lami, L4-5 PSF, c/b dural tear  - Mobilize as tolerated and if pain controlled   - Stable  - Pain Control  - DVT ppx: SCDs  - Post op abx: Ancef  - PT, WBS: WBAT if no headaches, dizziniess     Ortho Pager [phone]

## 2018-09-23 NOTE — PROGRESS NOTE ADULT - SUBJECTIVE AND OBJECTIVE BOX
Orthopaedic Spine Attending Note    S: no acute overnight events.  Pain not controlled incisional.  no radicular pain.  No fevers/chills/shortness of breath/chest pain/new neurologic complaints.    O:  T(C): 37.2 (09-23-18 @ 04:02), Max: 37.2 (09-23-18 @ 04:02)  HR: 89 (09-23-18 @ 06:22) (81 - 97)  BP: 123/78 (09-23-18 @ 06:22) (107/57 - 136/71)  RR: 20 (09-23-18 @ 06:22) (16 - 20)  SpO2: 98% (09-23-18 @ 06:22) (95% - 99%)  Wt(kg): --  GEN: NAD, A and O x 3  Incision: Dressing c/d/i  drain removed yesterday    Motor:  5/5 L2-S1 bilaterally    Sensory:  Sensation intact to light touch L2-S1 bilaterally    Vascular:  No edema, calf tenderness, wwp, 2+ DP bilateral lower extremities    Labs:                        11.0   15.9  )-----------( 164      ( 22 Sep 2018 08:19 )             33.2     22 Sep 2018 08:19    138    |  100    |  14     ----------------------------<  128    4.4     |  28     |  0.74     Ca    9.1        22 Sep 2018 08:19        A/P: 52y Male POD #3 s/p L3-5 lami, L4/5 PSF , doing well neurologically, no headaches with mobilization yesterday, has severe incisional pain since yesterday.  urinating freely.  -changed po pain medication regimen, should get po regimen and then if still in pain should get IV pushes for rescue.  -mobilize, OOB as tolerated when pain controlled  -PT  -pain control: Dr. Gutierrez  -SCDs  -dispo: home monday pending pain control

## 2018-09-24 PROBLEM — B19.20 UNSPECIFIED VIRAL HEPATITIS C WITHOUT HEPATIC COMA: Chronic | Status: ACTIVE | Noted: 2018-09-20

## 2018-09-24 PROBLEM — E78.5 HYPERLIPIDEMIA, UNSPECIFIED: Chronic | Status: ACTIVE | Noted: 2018-09-20

## 2018-09-24 PROBLEM — F90.9 ATTENTION-DEFICIT HYPERACTIVITY DISORDER, UNSPECIFIED TYPE: Chronic | Status: ACTIVE | Noted: 2018-09-20

## 2018-09-24 PROBLEM — R73.03 PREDIABETES: Chronic | Status: ACTIVE | Noted: 2018-09-20

## 2018-09-24 PROBLEM — F41.9 ANXIETY DISORDER, UNSPECIFIED: Chronic | Status: ACTIVE | Noted: 2018-09-20

## 2018-09-24 PROBLEM — I10 ESSENTIAL (PRIMARY) HYPERTENSION: Chronic | Status: ACTIVE | Noted: 2018-09-20

## 2018-09-24 LAB
ANION GAP SERPL CALC-SCNC: 10 MMOL/L — SIGNIFICANT CHANGE UP (ref 5–17)
BUN SERPL-MCNC: 17 MG/DL — SIGNIFICANT CHANGE UP (ref 7–23)
CALCIUM SERPL-MCNC: 9.2 MG/DL — SIGNIFICANT CHANGE UP (ref 8.4–10.5)
CHLORIDE SERPL-SCNC: 96 MMOL/L — SIGNIFICANT CHANGE UP (ref 96–108)
CO2 SERPL-SCNC: 29 MMOL/L — SIGNIFICANT CHANGE UP (ref 22–31)
CREAT SERPL-MCNC: 0.76 MG/DL — SIGNIFICANT CHANGE UP (ref 0.5–1.3)
GLUCOSE SERPL-MCNC: 113 MG/DL — HIGH (ref 70–99)
HCT VFR BLD CALC: 35.2 % — LOW (ref 39–50)
HGB BLD-MCNC: 11.5 G/DL — LOW (ref 13–17)
MCHC RBC-ENTMCNC: 26.7 PG — LOW (ref 27–34)
MCHC RBC-ENTMCNC: 32.7 G/DL — SIGNIFICANT CHANGE UP (ref 32–36)
MCV RBC AUTO: 81.7 FL — SIGNIFICANT CHANGE UP (ref 80–100)
PLATELET # BLD AUTO: 178 K/UL — SIGNIFICANT CHANGE UP (ref 150–400)
POTASSIUM SERPL-MCNC: 4.4 MMOL/L — SIGNIFICANT CHANGE UP (ref 3.5–5.3)
POTASSIUM SERPL-SCNC: 4.4 MMOL/L — SIGNIFICANT CHANGE UP (ref 3.5–5.3)
RBC # BLD: 4.31 M/UL — SIGNIFICANT CHANGE UP (ref 4.2–5.8)
RBC # FLD: 14 % — SIGNIFICANT CHANGE UP (ref 10.3–16.9)
SODIUM SERPL-SCNC: 135 MMOL/L — SIGNIFICANT CHANGE UP (ref 135–145)
WBC # BLD: 10.9 K/UL — HIGH (ref 3.8–10.5)
WBC # FLD AUTO: 10.9 K/UL — HIGH (ref 3.8–10.5)

## 2018-09-24 PROCEDURE — 72100 X-RAY EXAM L-S SPINE 2/3 VWS: CPT | Mod: 26

## 2018-09-24 RX ORDER — DEXAMETHASONE 0.5 MG/5ML
4 ELIXIR ORAL EVERY 6 HOURS
Qty: 0 | Refills: 0 | Status: DISCONTINUED | OUTPATIENT
Start: 2018-09-24 | End: 2018-09-26

## 2018-09-24 RX ORDER — HYDROMORPHONE HYDROCHLORIDE 2 MG/ML
6 INJECTION INTRAMUSCULAR; INTRAVENOUS; SUBCUTANEOUS EVERY 4 HOURS
Qty: 0 | Refills: 0 | Status: DISCONTINUED | OUTPATIENT
Start: 2018-09-24 | End: 2018-09-26

## 2018-09-24 RX ORDER — HYDROMORPHONE HYDROCHLORIDE 2 MG/ML
8 INJECTION INTRAMUSCULAR; INTRAVENOUS; SUBCUTANEOUS EVERY 4 HOURS
Qty: 0 | Refills: 0 | Status: DISCONTINUED | OUTPATIENT
Start: 2018-09-24 | End: 2018-09-26

## 2018-09-24 RX ADMIN — Medication 100 MILLIGRAM(S): at 13:39

## 2018-09-24 RX ADMIN — Medication 650 MILLIGRAM(S): at 05:02

## 2018-09-24 RX ADMIN — Medication 100 MILLIGRAM(S): at 00:10

## 2018-09-24 RX ADMIN — HYDROMORPHONE HYDROCHLORIDE 4 MILLIGRAM(S): 2 INJECTION INTRAMUSCULAR; INTRAVENOUS; SUBCUTANEOUS at 00:50

## 2018-09-24 RX ADMIN — Medication 650 MILLIGRAM(S): at 22:55

## 2018-09-24 RX ADMIN — Medication 100 MILLIGRAM(S): at 22:52

## 2018-09-24 RX ADMIN — BUPROPION HYDROCHLORIDE 300 MILLIGRAM(S): 150 TABLET, EXTENDED RELEASE ORAL at 12:45

## 2018-09-24 RX ADMIN — Medication 4 MILLIGRAM(S): at 12:37

## 2018-09-24 RX ADMIN — HYDROMORPHONE HYDROCHLORIDE 8 MILLIGRAM(S): 2 INJECTION INTRAMUSCULAR; INTRAVENOUS; SUBCUTANEOUS at 19:19

## 2018-09-24 RX ADMIN — Medication 100 MILLIGRAM(S): at 06:07

## 2018-09-24 RX ADMIN — Medication 40 MILLIGRAM(S): at 12:36

## 2018-09-24 RX ADMIN — HYDROMORPHONE HYDROCHLORIDE 0.5 MILLIGRAM(S): 2 INJECTION INTRAMUSCULAR; INTRAVENOUS; SUBCUTANEOUS at 06:06

## 2018-09-24 RX ADMIN — HYDROMORPHONE HYDROCHLORIDE 8 MILLIGRAM(S): 2 INJECTION INTRAMUSCULAR; INTRAVENOUS; SUBCUTANEOUS at 14:32

## 2018-09-24 RX ADMIN — SENNA PLUS 2 TABLET(S): 8.6 TABLET ORAL at 22:52

## 2018-09-24 RX ADMIN — HYDROMORPHONE HYDROCHLORIDE 0.5 MILLIGRAM(S): 2 INJECTION INTRAMUSCULAR; INTRAVENOUS; SUBCUTANEOUS at 01:35

## 2018-09-24 RX ADMIN — HYDROMORPHONE HYDROCHLORIDE 4 MILLIGRAM(S): 2 INJECTION INTRAMUSCULAR; INTRAVENOUS; SUBCUTANEOUS at 05:02

## 2018-09-24 RX ADMIN — HYDROMORPHONE HYDROCHLORIDE 0.5 MILLIGRAM(S): 2 INJECTION INTRAMUSCULAR; INTRAVENOUS; SUBCUTANEOUS at 01:05

## 2018-09-24 RX ADMIN — HYDROMORPHONE HYDROCHLORIDE 4 MILLIGRAM(S): 2 INJECTION INTRAMUSCULAR; INTRAVENOUS; SUBCUTANEOUS at 09:37

## 2018-09-24 RX ADMIN — HYDROMORPHONE HYDROCHLORIDE 0.5 MILLIGRAM(S): 2 INJECTION INTRAMUSCULAR; INTRAVENOUS; SUBCUTANEOUS at 06:35

## 2018-09-24 RX ADMIN — BUPROPION HYDROCHLORIDE 150 MILLIGRAM(S): 150 TABLET, EXTENDED RELEASE ORAL at 22:54

## 2018-09-24 RX ADMIN — Medication 1: at 22:53

## 2018-09-24 RX ADMIN — Medication 650 MILLIGRAM(S): at 06:01

## 2018-09-24 RX ADMIN — HYDROMORPHONE HYDROCHLORIDE 4 MILLIGRAM(S): 2 INJECTION INTRAMUSCULAR; INTRAVENOUS; SUBCUTANEOUS at 10:45

## 2018-09-24 RX ADMIN — HYDROMORPHONE HYDROCHLORIDE 0.5 MILLIGRAM(S): 2 INJECTION INTRAMUSCULAR; INTRAVENOUS; SUBCUTANEOUS at 11:48

## 2018-09-24 RX ADMIN — Medication 650 MILLIGRAM(S): at 13:40

## 2018-09-24 RX ADMIN — Medication 650 MILLIGRAM(S): at 22:52

## 2018-09-24 RX ADMIN — LAMOTRIGINE 200 MILLIGRAM(S): 25 TABLET, ORALLY DISINTEGRATING ORAL at 12:36

## 2018-09-24 RX ADMIN — LIOTHYRONINE SODIUM 50 MICROGRAM(S): 25 TABLET ORAL at 06:07

## 2018-09-24 RX ADMIN — Medication 4 MILLIGRAM(S): at 18:29

## 2018-09-24 RX ADMIN — Medication 5 MILLIGRAM(S): at 11:56

## 2018-09-24 RX ADMIN — HYDROMORPHONE HYDROCHLORIDE 4 MILLIGRAM(S): 2 INJECTION INTRAMUSCULAR; INTRAVENOUS; SUBCUTANEOUS at 00:10

## 2018-09-24 RX ADMIN — HYDROMORPHONE HYDROCHLORIDE 8 MILLIGRAM(S): 2 INJECTION INTRAMUSCULAR; INTRAVENOUS; SUBCUTANEOUS at 18:27

## 2018-09-24 RX ADMIN — HYDROMORPHONE HYDROCHLORIDE 0.5 MILLIGRAM(S): 2 INJECTION INTRAMUSCULAR; INTRAVENOUS; SUBCUTANEOUS at 10:48

## 2018-09-24 RX ADMIN — HYDROMORPHONE HYDROCHLORIDE 4 MILLIGRAM(S): 2 INJECTION INTRAMUSCULAR; INTRAVENOUS; SUBCUTANEOUS at 06:01

## 2018-09-24 RX ADMIN — HYDROMORPHONE HYDROCHLORIDE 8 MILLIGRAM(S): 2 INJECTION INTRAMUSCULAR; INTRAVENOUS; SUBCUTANEOUS at 13:40

## 2018-09-24 RX ADMIN — Medication 650 MILLIGRAM(S): at 14:30

## 2018-09-24 NOTE — DIETITIAN INITIAL EVALUATION ADULT. - +GENDER
Statement Selected Mohs Method Verbiage: An incision at a 45 degree angle following the standard Mohs approach was done and the specimen was harvested as a microscopic controlled layer.

## 2018-09-24 NOTE — PROGRESS NOTE ADULT - SUBJECTIVE AND OBJECTIVE BOX
SUBJECTIVE: Patient seen and examined. 52M POD#4 s/p Lami L3-L5, PSF L4-L5 c/b dural tear. Patient c/o severe pain in low back radiating down bilateral lower extremities. Denies headaches/dizziness. No numbness/tingling of extremities. No fever, chills, CP, SOB, N/V.    OBJECTIVE:     Vital Signs Last 24 Hrs  T(C): 37.7 (24 Sep 2018 09:00), Max: 38.1 (23 Sep 2018 21:02)  T(F): 99.9 (24 Sep 2018 09:00), Max: 100.5 (23 Sep 2018 21:02)  HR: 83 (24 Sep 2018 09:00) (83 - 92)  BP: 121/69 (24 Sep 2018 09:00) (111/68 - 137/81)  BP(mean): --  RR: 17 (24 Sep 2018 09:00) (16 - 18)  SpO2: 97% (24 Sep 2018 09:00) (93% - 98%)    PE:         Dressing: clean/dry/intact L-spine         Sensation: intact to light touch distally         Motor exam: 5/5 EHL/FHL/GS/TA bilaterally         Toes warm, well-perfused; capillary refill < 3 seconds              I&O's Detail    23 Sep 2018 07:01  -  24 Sep 2018 07:00  --------------------------------------------------------  IN:  Total IN: 0 mL    OUT:    Voided: 1300 mL  Total OUT: 1300 mL    Total NET: -1300 mL          LABS:                        11.5   10.9  )-----------( 178      ( 24 Sep 2018 05:31 )             35.2     09-24    135  |  96  |  17  ----------------------------<  113<H>  4.4   |  29  |  0.76    Ca    9.2      24 Sep 2018 05:31            MEDICATIONS:    acetaminophen   Tablet .. 650 milliGRAM(s) Oral every 6 hours  armodafinil 250 milliGRAM(s) Oral before breakfast  buPROPion XL . 300 milliGRAM(s) Oral daily  buPROPion XL . 150 milliGRAM(s) Oral at bedtime  dextroamphetamine 30 milliGRAM(s) Oral <User Schedule>  diazepam    Tablet 5 milliGRAM(s) Oral every 8 hours PRN  FLUoxetine 40 milliGRAM(s) Oral daily  HYDROmorphone   Tablet 8 milliGRAM(s) Oral every 4 hours PRN  HYDROmorphone   Tablet 6 milliGRAM(s) Oral every 4 hours PRN  HYDROmorphone  Injectable 0.5 milliGRAM(s) IV Push every 4 hours PRN  lamoTRIgine 200 milliGRAM(s) Oral daily  ondansetron Injectable 4 milliGRAM(s) IV Push every 6 hours PRN  pregabalin 100 milliGRAM(s) Oral three times a day        A/P: 53y/o Male s/p L3-5 Lami, L4-5 PSF, c/b dural tear  - Mobilize as tolerated and if pain controlled  - Stable  - Pain Control per pain management Dr. Gutierrez -- regimen adjusted  - DVT ppx: SCDs  - Post op abx: Ancef, completed  - PT, WBS: WBAT if no headaches/dizziness   - Dispo: home, pending PT clearance SUBJECTIVE: Patient seen and examined. 52M POD#4 s/p Lami L3-L5, PSF L4-L5 c/b dural tear. Patient c/o severe pain in low back radiating down bilateral lower extremities. Denies headaches/dizziness. No numbness/tingling of extremities. No fever, chills, CP, SOB, N/V.    OBJECTIVE:     Vital Signs Last 24 Hrs  T(C): 37.7 (24 Sep 2018 09:00), Max: 38.1 (23 Sep 2018 21:02)  T(F): 99.9 (24 Sep 2018 09:00), Max: 100.5 (23 Sep 2018 21:02)  HR: 83 (24 Sep 2018 09:00) (83 - 92)  BP: 121/69 (24 Sep 2018 09:00) (111/68 - 137/81)  BP(mean): --  RR: 17 (24 Sep 2018 09:00) (16 - 18)  SpO2: 97% (24 Sep 2018 09:00) (93% - 98%)    PE:         Dressing: clean/dry/intact L-spine         Sensation: intact to light touch distally         Motor exam: 5/5 EHL/FHL/GS/TA bilaterally         Toes warm, well-perfused; capillary refill < 3 seconds              I&O's Detail    23 Sep 2018 07:01  -  24 Sep 2018 07:00  --------------------------------------------------------  IN:  Total IN: 0 mL    OUT:    Voided: 1300 mL  Total OUT: 1300 mL    Total NET: -1300 mL          LABS:                        11.5   10.9  )-----------( 178      ( 24 Sep 2018 05:31 )             35.2     09-24    135  |  96  |  17  ----------------------------<  113<H>  4.4   |  29  |  0.76    Ca    9.2      24 Sep 2018 05:31            MEDICATIONS:    acetaminophen   Tablet .. 650 milliGRAM(s) Oral every 6 hours  armodafinil 250 milliGRAM(s) Oral before breakfast  buPROPion XL . 300 milliGRAM(s) Oral daily  buPROPion XL . 150 milliGRAM(s) Oral at bedtime  dextroamphetamine 30 milliGRAM(s) Oral <User Schedule>  diazepam    Tablet 5 milliGRAM(s) Oral every 8 hours PRN  FLUoxetine 40 milliGRAM(s) Oral daily  HYDROmorphone   Tablet 8 milliGRAM(s) Oral every 4 hours PRN  HYDROmorphone   Tablet 6 milliGRAM(s) Oral every 4 hours PRN  HYDROmorphone  Injectable 0.5 milliGRAM(s) IV Push every 4 hours PRN  lamoTRIgine 200 milliGRAM(s) Oral daily  ondansetron Injectable 4 milliGRAM(s) IV Push every 6 hours PRN  pregabalin 100 milliGRAM(s) Oral three times a day        A/P: 53y/o Male s/p L3-5 Lami, L4-5 PSF, c/b dural tear  - Mobilize as tolerated and if pain controlled  - Stable  - Pain Control per pain management Dr. Gutierrez -- regimen adjusted  - DVT ppx: SCDs  - Post op abx: Ancef, completed  - PT, WBS: WBAT if no headaches/dizziness   - Postop XR L-spine supine  - Dispo: home, pending PT clearance

## 2018-09-24 NOTE — DIETITIAN INITIAL EVALUATION ADULT. - PERTINENT LABORATORY DATA
9/24: hemoglobin 11.5, hematocrit 35.2, glucose 113, 9/21: Hgb A1c 5.0%, POCT: 9/24 (106-123), 9/23 (), 9/22 (103-132)

## 2018-09-24 NOTE — DIETITIAN INITIAL EVALUATION ADULT. - OTHER INFO
Pt seen for initial assessment. Admit: S/P elective laminectomy L3-5, PSF L4-5 (9/20). PMH: dyslipidemia, HTN, pre-DM. Pt seen for initial assessment. Admit: S/P elective laminectomy L3-5, PSF L4-5 (9/20). PMH: dyslipidemia, HTN, pre-DM. Skin: no edema, +surgical incision. Pt on consistent carbohydrate diet no snack with PO of 85%. Note: pt reports hx of elevated blood glucose, per pt home medications include metformin. Pt reports UBW 85kg with wt loss of 3.6kg over past month due to decreased appetite related to pain with current wt of 81.6kg. NKFA. Denies N/V/D, reporting some constipation with last BM 9/19 (noted on bowel regimen: colace, senna).

## 2018-09-24 NOTE — DIETITIAN INITIAL EVALUATION ADULT. - SOURCE
patient/other (specify)/family/significant other family/significant other/other (specify)/EMR reviewed, partner at bedside/patient

## 2018-09-24 NOTE — PROGRESS NOTE ADULT - SUBJECTIVE AND OBJECTIVE BOX
Neurology Follow up note    Name  ILANA CHEW    HPI:  52M c/o back pain worsened over time without improvement. Failed conservative treatments. States intermittent numbness, tingling radiating to thighs front and back. Ambulates without assist. Presents today for elective Lami L3-5, PSF L4-5. (20 Sep 2018 10:58)      Interval History - back pain and radicular symptoms in the RLE        REVIEW OF SYSTEMS    Vital Signs Last 24 Hrs  T(C): 36.9 (24 Sep 2018 04:29), Max: 38.1 (23 Sep 2018 21:02)  T(F): 98.4 (24 Sep 2018 04:29), Max: 100.5 (23 Sep 2018 21:02)  HR: 87 (24 Sep 2018 06:00) (86 - 92)  BP: 124/73 (24 Sep 2018 06:00) (110/76 - 137/81)  BP(mean): --  RR: 18 (24 Sep 2018 06:00) (16 - 18)  SpO2: 96% (24 Sep 2018 06:00) (93% - 98%)    Physical Exam-     Mental Status- awake and alert    Cranial Nerves- full EOM    Gait and station- n/a    Motor- moves all 4 extremities    Reflexes- decreased    Sensation- no sensory level    Coordination- no tremors    Vascular - no bruits    Medications  acetaminophen   Tablet .. 650 milliGRAM(s) Oral every 6 hours  armodafinil 250 milliGRAM(s) Oral before breakfast  BUpivacaine liposome 1.3% Injectable (no eMAR) 20 milliLiter(s) Local Injection once  buPROPion XL . 300 milliGRAM(s) Oral daily  buPROPion XL . 150 milliGRAM(s) Oral at bedtime  dextroamphetamine 30 milliGRAM(s) Oral <User Schedule>  dextrose 40% Gel 15 Gram(s) Oral once PRN  dextrose 50% Injectable 12.5 Gram(s) IV Push once  dextrose 50% Injectable 25 Gram(s) IV Push once  dextrose 50% Injectable 25 Gram(s) IV Push once  diazepam    Tablet 5 milliGRAM(s) Oral every 8 hours PRN  docusate sodium 100 milliGRAM(s) Oral three times a day  FLUoxetine 40 milliGRAM(s) Oral daily  glucagon  Injectable 1 milliGRAM(s) IntraMuscular once PRN  HYDROmorphone   Tablet 2 milliGRAM(s) Oral every 4 hours PRN  HYDROmorphone   Tablet 4 milliGRAM(s) Oral every 4 hours PRN  HYDROmorphone  Injectable 0.5 milliGRAM(s) IV Push every 4 hours PRN  influenza   Vaccine 0.5 milliLiter(s) IntraMuscular once  insulin lispro (HumaLOG) corrective regimen sliding scale   SubCutaneous Before meals and at bedtime  lamoTRIgine 200 milliGRAM(s) Oral daily  liothyronine 50 MICROGram(s) Oral daily  naloxone Injectable 0.1 milliGRAM(s) IV Push every 3 minutes PRN  ondansetron Injectable 4 milliGRAM(s) IV Push every 6 hours PRN  pregabalin 100 milliGRAM(s) Oral three times a day  senna 2 Tablet(s) Oral at bedtime      Lab      Radiology    Assessment- Lumbar radiculopathy    Plan as per ortho

## 2018-09-24 NOTE — DIETITIAN INITIAL EVALUATION ADULT. - NS AS NUTRI INTERV MEALS SNACK
Encouraged PO intake, encouraged consumption of protein rich foods, examples provided. Continue current diet order.

## 2018-09-24 NOTE — PROGRESS NOTE ADULT - SUBJECTIVE AND OBJECTIVE BOX
Ortho    No HA or dizziness  Pt still in spastic pain, requires IV pain meds for rescue.  Denies CP, SOB, N/V, numbness/tingling     Vital Signs Last 24 Hrs  T(C): 36.9 (24 Sep 2018 04:29), Max: 38.1 (23 Sep 2018 21:02)  T(F): 98.4 (24 Sep 2018 04:29), Max: 100.5 (23 Sep 2018 21:02)  HR: 87 (24 Sep 2018 06:00) (86 - 92)  BP: 124/73 (24 Sep 2018 06:00) (110/76 - 137/81)  BP(mean): --  RR: 18 (24 Sep 2018 06:00) (16 - 18)  SpO2: 96% (24 Sep 2018 06:00) (93% - 98%)    NAD, non labored respirations  Affected extremity:          Dressing: clean/dry/intact; dsg changed this am          Sensation: [ x] intact to light touch  [ ] decreased                              Vascular: [x ] warm well perfused; capillary refill <3 seconds          Motor exam: [ x]         [ ] Upper extremity                   [place] (c5)   Bi(c5/6)   WE(c6)   EE(c7)    (c8)                                                R           5              5            5             5             5                                               L            5              5            5             5            5         [x ] Lower extremity                   IP(L2)     Q(L3)     TA(L4)     EHL(L5)     GS(s1)                                                 R          5           5          5            5              5                                               L           5           5         5             5              5                            11.5   10.9  )-----------( 178      ( 24 Sep 2018 05:31 )             35.2   09-24    135  |  96  |  17  ----------------------------<  113<H>  4.4   |  29  |  0.76    Ca    9.2      24 Sep 2018 05:31        A/P: 52yMale s/p L3-5 Lami, L4-5 PSF, c/b dural tear  - Mobilize as tolerated and if pain controlled   - Stable  - Pain Control  - DVT ppx: SCDs  - Post op abx: Ancef  - PT, WBS: WBAT if no headaches, dizziniess     Ortho Pager [phone]

## 2018-09-24 NOTE — DIETITIAN INITIAL EVALUATION ADULT. - ENERGY NEEDS
Ht: 180.3cm, Wt: 81.6kg, IBW: 78.2kg, 104.3%IBW  Needs calculated based on [facility] standards of care; Increased protein provided to pt to promote healing post surgery.

## 2018-09-25 LAB
ANION GAP SERPL CALC-SCNC: 12 MMOL/L — SIGNIFICANT CHANGE UP (ref 5–17)
BUN SERPL-MCNC: 16 MG/DL — SIGNIFICANT CHANGE UP (ref 7–23)
CALCIUM SERPL-MCNC: 9.5 MG/DL — SIGNIFICANT CHANGE UP (ref 8.4–10.5)
CHLORIDE SERPL-SCNC: 98 MMOL/L — SIGNIFICANT CHANGE UP (ref 96–108)
CO2 SERPL-SCNC: 27 MMOL/L — SIGNIFICANT CHANGE UP (ref 22–31)
CREAT SERPL-MCNC: 0.65 MG/DL — SIGNIFICANT CHANGE UP (ref 0.5–1.3)
GLUCOSE SERPL-MCNC: 167 MG/DL — HIGH (ref 70–99)
HCT VFR BLD CALC: 33.6 % — LOW (ref 39–50)
HGB BLD-MCNC: 11.2 G/DL — LOW (ref 13–17)
MCHC RBC-ENTMCNC: 26.7 PG — LOW (ref 27–34)
MCHC RBC-ENTMCNC: 33.3 G/DL — SIGNIFICANT CHANGE UP (ref 32–36)
MCV RBC AUTO: 80 FL — SIGNIFICANT CHANGE UP (ref 80–100)
PLATELET # BLD AUTO: 208 K/UL — SIGNIFICANT CHANGE UP (ref 150–400)
POTASSIUM SERPL-MCNC: 4.6 MMOL/L — SIGNIFICANT CHANGE UP (ref 3.5–5.3)
POTASSIUM SERPL-SCNC: 4.6 MMOL/L — SIGNIFICANT CHANGE UP (ref 3.5–5.3)
RBC # BLD: 4.2 M/UL — SIGNIFICANT CHANGE UP (ref 4.2–5.8)
RBC # FLD: 13.3 % — SIGNIFICANT CHANGE UP (ref 10.3–16.9)
SODIUM SERPL-SCNC: 137 MMOL/L — SIGNIFICANT CHANGE UP (ref 135–145)
WBC # BLD: 12.4 K/UL — HIGH (ref 3.8–10.5)
WBC # FLD AUTO: 12.4 K/UL — HIGH (ref 3.8–10.5)

## 2018-09-25 RX ORDER — NALOXEGOL OXALATE 12.5 MG/1
12.5 TABLET, FILM COATED ORAL
Qty: 0 | Refills: 0 | Status: DISCONTINUED | OUTPATIENT
Start: 2018-09-25 | End: 2018-09-26

## 2018-09-25 RX ADMIN — Medication 1: at 07:14

## 2018-09-25 RX ADMIN — HYDROMORPHONE HYDROCHLORIDE 8 MILLIGRAM(S): 2 INJECTION INTRAMUSCULAR; INTRAVENOUS; SUBCUTANEOUS at 17:16

## 2018-09-25 RX ADMIN — HYDROMORPHONE HYDROCHLORIDE 8 MILLIGRAM(S): 2 INJECTION INTRAMUSCULAR; INTRAVENOUS; SUBCUTANEOUS at 04:32

## 2018-09-25 RX ADMIN — HYDROMORPHONE HYDROCHLORIDE 8 MILLIGRAM(S): 2 INJECTION INTRAMUSCULAR; INTRAVENOUS; SUBCUTANEOUS at 09:50

## 2018-09-25 RX ADMIN — HYDROMORPHONE HYDROCHLORIDE 8 MILLIGRAM(S): 2 INJECTION INTRAMUSCULAR; INTRAVENOUS; SUBCUTANEOUS at 05:15

## 2018-09-25 RX ADMIN — Medication 4 MILLIGRAM(S): at 19:24

## 2018-09-25 RX ADMIN — Medication 650 MILLIGRAM(S): at 22:03

## 2018-09-25 RX ADMIN — Medication 4 MILLIGRAM(S): at 00:15

## 2018-09-25 RX ADMIN — Medication 100 MILLIGRAM(S): at 22:04

## 2018-09-25 RX ADMIN — Medication 650 MILLIGRAM(S): at 07:16

## 2018-09-25 RX ADMIN — NALOXEGOL OXALATE 12.5 MILLIGRAM(S): 12.5 TABLET, FILM COATED ORAL at 22:06

## 2018-09-25 RX ADMIN — HYDROMORPHONE HYDROCHLORIDE 8 MILLIGRAM(S): 2 INJECTION INTRAMUSCULAR; INTRAVENOUS; SUBCUTANEOUS at 09:19

## 2018-09-25 RX ADMIN — BUPROPION HYDROCHLORIDE 150 MILLIGRAM(S): 150 TABLET, EXTENDED RELEASE ORAL at 22:03

## 2018-09-25 RX ADMIN — Medication 100 MILLIGRAM(S): at 14:46

## 2018-09-25 RX ADMIN — Medication 650 MILLIGRAM(S): at 15:00

## 2018-09-25 RX ADMIN — Medication 40 MILLIGRAM(S): at 12:40

## 2018-09-25 RX ADMIN — LAMOTRIGINE 200 MILLIGRAM(S): 25 TABLET, ORALLY DISINTEGRATING ORAL at 12:44

## 2018-09-25 RX ADMIN — SENNA PLUS 2 TABLET(S): 8.6 TABLET ORAL at 22:03

## 2018-09-25 RX ADMIN — Medication 30 MILLIGRAM(S): at 12:44

## 2018-09-25 RX ADMIN — Medication 100 MILLIGRAM(S): at 05:50

## 2018-09-25 RX ADMIN — Medication 4 MILLIGRAM(S): at 05:50

## 2018-09-25 RX ADMIN — NALOXEGOL OXALATE 12.5 MILLIGRAM(S): 12.5 TABLET, FILM COATED ORAL at 12:40

## 2018-09-25 RX ADMIN — Medication 650 MILLIGRAM(S): at 14:29

## 2018-09-25 RX ADMIN — Medication 650 MILLIGRAM(S): at 07:15

## 2018-09-25 RX ADMIN — Medication 650 MILLIGRAM(S): at 22:51

## 2018-09-25 RX ADMIN — Medication 4 MILLIGRAM(S): at 12:44

## 2018-09-25 RX ADMIN — BUPROPION HYDROCHLORIDE 300 MILLIGRAM(S): 150 TABLET, EXTENDED RELEASE ORAL at 12:40

## 2018-09-25 RX ADMIN — Medication 100 MILLIGRAM(S): at 14:29

## 2018-09-25 RX ADMIN — Medication 100 MILLIGRAM(S): at 22:03

## 2018-09-25 RX ADMIN — Medication 5 MILLIGRAM(S): at 14:28

## 2018-09-25 RX ADMIN — LIOTHYRONINE SODIUM 50 MICROGRAM(S): 25 TABLET ORAL at 05:50

## 2018-09-25 NOTE — PROGRESS NOTE ADULT - SUBJECTIVE AND OBJECTIVE BOX
Neurology Follow up note    Name  ILANA CHEW    HPI:  52M c/o back pain worsened over time without improvement. Failed conservative treatments. States intermittent numbness, tingling radiating to thighs front and back. Ambulates without assist. Presents today for elective Lami L3-5, PSF L4-5. (20 Sep 2018 10:58)      Interval History - radicular symptoms in LE resolved - continues to have back pain        REVIEW OF SYSTEMS    Vital Signs Last 24 Hrs  T(C): 36 (25 Sep 2018 09:20), Max: 37.3 (24 Sep 2018 16:25)  T(F): 96.8 (25 Sep 2018 09:20), Max: 99.2 (24 Sep 2018 16:25)  HR: 80 (25 Sep 2018 09:20) (80 - 86)  BP: 120/58 (25 Sep 2018 09:20) (119/67 - 135/80)  BP(mean): --  RR: 17 (25 Sep 2018 04:28) (16 - 17)  SpO2: 98% (25 Sep 2018 04:28) (96% - 99%)    Physical Exam-     Mental Status- awake and alert    Cranial Nerves- full EOM    Gait and station- n/a    Motor- moves all 4 extremities     Reflexes- decreased     Sensation- no sensory level    Coordination- no tremors    Vascular - no bruits    Medications  acetaminophen   Tablet .. 650 milliGRAM(s) Oral every 6 hours  armodafinil 250 milliGRAM(s) Oral before breakfast  BUpivacaine liposome 1.3% Injectable (no eMAR) 20 milliLiter(s) Local Injection once  buPROPion XL . 300 milliGRAM(s) Oral daily  buPROPion XL . 150 milliGRAM(s) Oral at bedtime  dexamethasone  Injectable 4 milliGRAM(s) IV Push every 6 hours  dextroamphetamine 30 milliGRAM(s) Oral <User Schedule>  dextrose 40% Gel 15 Gram(s) Oral once PRN  dextrose 50% Injectable 12.5 Gram(s) IV Push once  dextrose 50% Injectable 25 Gram(s) IV Push once  dextrose 50% Injectable 25 Gram(s) IV Push once  diazepam    Tablet 5 milliGRAM(s) Oral every 8 hours PRN  docusate sodium 100 milliGRAM(s) Oral three times a day  FLUoxetine 40 milliGRAM(s) Oral daily  glucagon  Injectable 1 milliGRAM(s) IntraMuscular once PRN  HYDROmorphone   Tablet 8 milliGRAM(s) Oral every 4 hours PRN  HYDROmorphone   Tablet 6 milliGRAM(s) Oral every 4 hours PRN  HYDROmorphone  Injectable 0.5 milliGRAM(s) IV Push every 4 hours PRN  influenza   Vaccine 0.5 milliLiter(s) IntraMuscular once  insulin lispro (HumaLOG) corrective regimen sliding scale   SubCutaneous Before meals and at bedtime  lamoTRIgine 200 milliGRAM(s) Oral daily  liothyronine 50 MICROGram(s) Oral daily  naloxegol 12.5 milliGRAM(s) Oral <User Schedule>  naloxone Injectable 0.1 milliGRAM(s) IV Push every 3 minutes PRN  ondansetron Injectable 4 milliGRAM(s) IV Push every 6 hours PRN  pregabalin 100 milliGRAM(s) Oral three times a day  senna 2 Tablet(s) Oral at bedtime      Lab      Radiology    Assessment- Lumbar  radiculopathy    Plan- as per Ortho

## 2018-09-25 NOTE — PROGRESS NOTE ADULT - SUBJECTIVE AND OBJECTIVE BOX
Ortho Note    Pt comfortable without complaints, pain controlled  Denies CP, SOB, N/V, numbness/tingling     Vital Signs Last 24 Hrs  T(C): 36 (09-25-18 @ 09:20), Max: 36 (09-25-18 @ 09:20)  T(F): 96.8 (09-25-18 @ 09:20), Max: 96.8 (09-25-18 @ 09:20)  HR: 80 (09-25-18 @ 09:20) (80 - 80)  BP: 120/58 (09-25-18 @ 09:20) (120/58 - 120/58)  BP(mean): --  RR: --  SpO2: --    General: Pt Alert and oriented, NAD  incision C/D/I back  Pulses intact b/l LE  Sensation intact b/l LE  Motor: EHL/FHL/TA/GS 5/5 b/l                          11.2   12.4  )-----------( 208      ( 25 Sep 2018 05:36 )             33.6   25 Sep 2018 05:36    137    |  98     |  16     ----------------------------<  167    4.6     |  27     |  0.65     Ca    9.5        25 Sep 2018 05:36        A/P: 52yMale POD#5 s/p L3-5 Lami, PSF L4-5, dural repair  - Stable  - Pain Control  - DVT ppx: scds  - PT, WBS: WBAT    Ortho Pager [phone]

## 2018-09-26 VITALS
DIASTOLIC BLOOD PRESSURE: 72 MMHG | TEMPERATURE: 98 F | RESPIRATION RATE: 18 BRPM | SYSTOLIC BLOOD PRESSURE: 140 MMHG | OXYGEN SATURATION: 98 % | HEART RATE: 80 BPM

## 2018-09-26 PROCEDURE — 86850 RBC ANTIBODY SCREEN: CPT

## 2018-09-26 PROCEDURE — 36415 COLL VENOUS BLD VENIPUNCTURE: CPT

## 2018-09-26 PROCEDURE — 82962 GLUCOSE BLOOD TEST: CPT

## 2018-09-26 PROCEDURE — 97161 PT EVAL LOW COMPLEX 20 MIN: CPT

## 2018-09-26 PROCEDURE — 86901 BLOOD TYPING SEROLOGIC RH(D): CPT

## 2018-09-26 PROCEDURE — 90686 IIV4 VACC NO PRSV 0.5 ML IM: CPT

## 2018-09-26 PROCEDURE — 97116 GAIT TRAINING THERAPY: CPT

## 2018-09-26 PROCEDURE — C1889: CPT

## 2018-09-26 PROCEDURE — C1713: CPT

## 2018-09-26 PROCEDURE — 85018 HEMOGLOBIN: CPT

## 2018-09-26 PROCEDURE — 84132 ASSAY OF SERUM POTASSIUM: CPT

## 2018-09-26 PROCEDURE — 72100 X-RAY EXAM L-S SPINE 2/3 VWS: CPT

## 2018-09-26 PROCEDURE — 85027 COMPLETE CBC AUTOMATED: CPT

## 2018-09-26 PROCEDURE — 95940 IONM IN OPERATNG ROOM 15 MIN: CPT

## 2018-09-26 PROCEDURE — 86900 BLOOD TYPING SEROLOGIC ABO: CPT

## 2018-09-26 PROCEDURE — 80048 BASIC METABOLIC PNL TOTAL CA: CPT

## 2018-09-26 PROCEDURE — 82330 ASSAY OF CALCIUM: CPT

## 2018-09-26 PROCEDURE — 83036 HEMOGLOBIN GLYCOSYLATED A1C: CPT

## 2018-09-26 PROCEDURE — 85025 COMPLETE CBC W/AUTO DIFF WBC: CPT

## 2018-09-26 PROCEDURE — 76000 FLUOROSCOPY <1 HR PHYS/QHP: CPT

## 2018-09-26 PROCEDURE — 84295 ASSAY OF SERUM SODIUM: CPT

## 2018-09-26 RX ORDER — DEXAMETHASONE 0.5 MG/5ML
1 ELIXIR ORAL
Qty: 10 | Refills: 0 | OUTPATIENT
Start: 2018-09-26 | End: 2018-09-29

## 2018-09-26 RX ORDER — GABAPENTIN 400 MG/1
1 CAPSULE ORAL
Qty: 21 | Refills: 0 | OUTPATIENT
Start: 2018-09-26 | End: 2018-10-02

## 2018-09-26 RX ORDER — DIAZEPAM 5 MG
1 TABLET ORAL
Qty: 14 | Refills: 0 | OUTPATIENT
Start: 2018-09-26

## 2018-09-26 RX ORDER — HYDROMORPHONE HYDROCHLORIDE 2 MG/ML
1 INJECTION INTRAMUSCULAR; INTRAVENOUS; SUBCUTANEOUS
Qty: 28 | Refills: 0 | OUTPATIENT
Start: 2018-09-26

## 2018-09-26 RX ORDER — TRAMADOL HYDROCHLORIDE 50 MG/1
1 TABLET ORAL
Qty: 0 | Refills: 0 | COMMUNITY

## 2018-09-26 RX ADMIN — Medication 40 MILLIGRAM(S): at 10:39

## 2018-09-26 RX ADMIN — HYDROMORPHONE HYDROCHLORIDE 8 MILLIGRAM(S): 2 INJECTION INTRAMUSCULAR; INTRAVENOUS; SUBCUTANEOUS at 06:13

## 2018-09-26 RX ADMIN — HYDROMORPHONE HYDROCHLORIDE 8 MILLIGRAM(S): 2 INJECTION INTRAMUSCULAR; INTRAVENOUS; SUBCUTANEOUS at 07:00

## 2018-09-26 RX ADMIN — HYDROMORPHONE HYDROCHLORIDE 8 MILLIGRAM(S): 2 INJECTION INTRAMUSCULAR; INTRAVENOUS; SUBCUTANEOUS at 10:40

## 2018-09-26 RX ADMIN — HYDROMORPHONE HYDROCHLORIDE 8 MILLIGRAM(S): 2 INJECTION INTRAMUSCULAR; INTRAVENOUS; SUBCUTANEOUS at 11:10

## 2018-09-26 RX ADMIN — LIOTHYRONINE SODIUM 50 MICROGRAM(S): 25 TABLET ORAL at 06:18

## 2018-09-26 RX ADMIN — Medication 100 MILLIGRAM(S): at 06:13

## 2018-09-26 RX ADMIN — Medication 650 MILLIGRAM(S): at 07:00

## 2018-09-26 RX ADMIN — LAMOTRIGINE 200 MILLIGRAM(S): 25 TABLET, ORALLY DISINTEGRATING ORAL at 10:39

## 2018-09-26 RX ADMIN — Medication 1: at 11:45

## 2018-09-26 RX ADMIN — Medication 4 MILLIGRAM(S): at 06:14

## 2018-09-26 RX ADMIN — BUPROPION HYDROCHLORIDE 300 MILLIGRAM(S): 150 TABLET, EXTENDED RELEASE ORAL at 10:40

## 2018-09-26 RX ADMIN — INFLUENZA VIRUS VACCINE 0.5 MILLILITER(S): 15; 15; 15; 15 SUSPENSION INTRAMUSCULAR at 12:16

## 2018-09-26 RX ADMIN — Medication 4 MILLIGRAM(S): at 00:58

## 2018-09-26 RX ADMIN — Medication 4 MILLIGRAM(S): at 11:53

## 2018-09-26 RX ADMIN — Medication 650 MILLIGRAM(S): at 06:12

## 2018-09-26 RX ADMIN — NALOXEGOL OXALATE 12.5 MILLIGRAM(S): 12.5 TABLET, FILM COATED ORAL at 10:39

## 2018-09-26 RX ADMIN — HYDROMORPHONE HYDROCHLORIDE 8 MILLIGRAM(S): 2 INJECTION INTRAMUSCULAR; INTRAVENOUS; SUBCUTANEOUS at 00:58

## 2018-09-26 RX ADMIN — Medication 100 MILLIGRAM(S): at 06:14

## 2018-09-26 RX ADMIN — HYDROMORPHONE HYDROCHLORIDE 8 MILLIGRAM(S): 2 INJECTION INTRAMUSCULAR; INTRAVENOUS; SUBCUTANEOUS at 01:40

## 2018-09-26 NOTE — PROGRESS NOTE ADULT - REASON FOR ADMISSION
Back pain
Spinal Stenosis/Spondylolisthesis
spondylolistehsis, spinal stenosis
Back pain

## 2018-09-26 NOTE — PROGRESS NOTE ADULT - SUBJECTIVE AND OBJECTIVE BOX
Ortho    No HA or dizziness  Pt still in spastic pain, requires IV pain meds for rescue.  Denies CP, SOB, N/V, numbness/tingling     Vital Signs Last 24 Hrs  T(C): 36.7 (26 Sep 2018 05:05), Max: 36.9 (25 Sep 2018 14:51)  T(F): 98.1 (26 Sep 2018 05:05), Max: 98.5 (25 Sep 2018 14:51)  HR: 80 (26 Sep 2018 05:05) (80 - 100)  BP: 131/81 (26 Sep 2018 05:05) (120/58 - 146/72)  BP(mean): --  RR: 17 (26 Sep 2018 05:05) (15 - 17)  SpO2: 98% (26 Sep 2018 05:05) (95% - 98%)    NAD, non labored respirations  Affected extremity:          Dressing: clean/dry/intact; dsg changed this am          Sensation: [ x] intact to light touch  [ ] decreased                              Vascular: [x ] warm well perfused; capillary refill <3 seconds          Motor exam: [ x]         [ ] Upper extremity                   [place] (c5)   Bi(c5/6)   WE(c6)   EE(c7)    (c8)                                                R           5              5            5             5             5                                               L            5              5            5             5            5         [x ] Lower extremity                   IP(L2)     Q(L3)     TA(L4)     EHL(L5)     GS(s1)                                                 R          5           5          5            5              5                                               L           5           5         5             5              5                            11.2   12.4  )-----------( 208      ( 25 Sep 2018 05:36 )             33.6         A/P: 52yMale s/p L3-5 Lami, L4-5 PSF, c/b dural tear  - Mobilize as tolerated and if pain controlled   - Stable  - Pain Control  - DVT ppx: SCDs  - Post op abx: Ancef  - PT, WBS: WBAT if no headaches, dizziniess   -Dispo: home    Ortho Pager [phone]

## 2018-09-26 NOTE — PROGRESS NOTE ADULT - ATTENDING COMMENTS
still with significant back pain.  also with some element of rle radiating pain overnight.  pain control overall improved in last 24 hours.  will continue to work with dr. gleason to titrate his regimen.  XR lumbar today supine AP/Lat as per protocol.  Will restart decadron at 4mg q6h IV standing to assist with pain control and radicular element.  Dispo possible tuesday if pain controlled and ambulating.
still with significant back pain.  also with some element of rle radiating pain overnight.  pain control overall improved in last 24 hours.  will continue to work with dr. gleason to titrate his regimen.  XR lumbar today supine AP/Lat as per protocol.  Will restart decadron at 4mg q6h IV standing to assist with pain control and radicular element.  Dispo possible tuesday if pain controlled and ambulating.

## 2018-09-26 NOTE — PROGRESS NOTE ADULT - SUBJECTIVE AND OBJECTIVE BOX
POST OPERATIVE DAY #6 s/p L3-L5 laminectomy, L4-L5 PSF, dural repair    SUBJECTIVE: Patient seen and examined.    Denies chest pain/SOB. Feeling well this AM, asks when he can be discharged today.   Pain well controlled.       OBJECTIVE:     Vital Signs Last 24 Hrs  T(C): 36.7 (26 Sep 2018 08:50), Max: 36.9 (25 Sep 2018 14:51)  T(F): 98 (26 Sep 2018 08:50), Max: 98.5 (25 Sep 2018 14:51)  HR: 80 (26 Sep 2018 08:50) (80 - 100)  BP: 140/72 (26 Sep 2018 08:50) (120/68 - 146/72)  BP(mean): --  RR: 18 (26 Sep 2018 08:50) (15 - 18)  SpO2: 98% (26 Sep 2018 08:50) (95% - 98%)    PE of BLE         Dressing: clean/dry/intact          Sensation: intact to light touch to patient's baseline. Wiggles toes and BLE without pain or difficulty. Skin warm and well perfused. Cap refill brisk. pulses palpable BLE.              I&O's Detail    25 Sep 2018 07:01  -  26 Sep 2018 07:00  --------------------------------------------------------  IN:    Oral Fluid: 540 mL  Total IN: 540 mL    OUT:    Voided: 1890 mL  Total OUT: 1890 mL    Total NET: -1350 mL    LABS:                        11.2   12.4  )-----------( 208      ( 25 Sep 2018 05:36 )             33.6     09-25    137  |  98  |  16  ----------------------------<  167<H>  4.6   |  27  |  0.65    Ca    9.5      25 Sep 2018 05:36    MEDICATIONS:    acetaminophen   Tablet .. 650 milliGRAM(s) Oral every 6 hours  armodafinil 250 milliGRAM(s) Oral before breakfast  buPROPion XL . 300 milliGRAM(s) Oral daily  buPROPion XL . 150 milliGRAM(s) Oral at bedtime  dextroamphetamine 30 milliGRAM(s) Oral <User Schedule>  diazepam    Tablet 5 milliGRAM(s) Oral every 8 hours PRN  FLUoxetine 40 milliGRAM(s) Oral daily  HYDROmorphone   Tablet 8 milliGRAM(s) Oral every 4 hours PRN  HYDROmorphone   Tablet 6 milliGRAM(s) Oral every 4 hours PRN  HYDROmorphone  Injectable 0.5 milliGRAM(s) IV Push every 4 hours PRN  lamoTRIgine 200 milliGRAM(s) Oral daily  ondansetron Injectable 4 milliGRAM(s) IV Push every 6 hours PRN  pregabalin 100 milliGRAM(s) Oral three times a day        RADIOLOGY & ADDITIONAL STUDIES:    ASSESSMENT AND PLAN:     1. Analgesic pain control by Dr. Gutierrez (pain management)  2. DVT prophylaxis: SCDs  3. Weight Bearing Status:WBAT    4. Disposition: Home once clears PT  5. Decadron standing for inflammation, Movantik given to encourage BM prior to discharge

## 2018-09-26 NOTE — PROGRESS NOTE ADULT - SUBJECTIVE AND OBJECTIVE BOX
Neurology Follow up note    Name  ILANA CHEW    HPI:  52M c/o back pain worsened over time without improvement. Failed conservative treatments. States intermittent numbness, tingling radiating to thighs front and back. Ambulates without assist. Presents today for elective Lami L3-5, PSF L4-5. (20 Sep 2018 10:58)      Interval History - back pain - no new radicular symptoms in the LE        REVIEW OF SYSTEMS    Vital Signs Last 24 Hrs  T(C): 36.7 (26 Sep 2018 05:05), Max: 36.9 (25 Sep 2018 14:51)  T(F): 98.1 (26 Sep 2018 05:05), Max: 98.5 (25 Sep 2018 14:51)  HR: 80 (26 Sep 2018 05:05) (80 - 100)  BP: 131/81 (26 Sep 2018 05:05) (120/58 - 146/72)  BP(mean): --  RR: 17 (26 Sep 2018 05:05) (15 - 17)  SpO2: 98% (26 Sep 2018 05:05) (95% - 98%)    Physical Exam-     Mental Status- awake and alert    Cranial Nerves- full EOM    Gait and station- n/a    Motor- moves all 4 extremities    Reflexes- decreased    Sensation- no sensory level    Coordination- no tremors    Vascular - no bruits    Medications  acetaminophen   Tablet .. 650 milliGRAM(s) Oral every 6 hours  armodafinil 250 milliGRAM(s) Oral before breakfast  BUpivacaine liposome 1.3% Injectable (no eMAR) 20 milliLiter(s) Local Injection once  buPROPion XL . 300 milliGRAM(s) Oral daily  buPROPion XL . 150 milliGRAM(s) Oral at bedtime  dexamethasone  Injectable 4 milliGRAM(s) IV Push every 6 hours  dextroamphetamine 30 milliGRAM(s) Oral <User Schedule>  dextrose 40% Gel 15 Gram(s) Oral once PRN  dextrose 50% Injectable 12.5 Gram(s) IV Push once  dextrose 50% Injectable 25 Gram(s) IV Push once  dextrose 50% Injectable 25 Gram(s) IV Push once  diazepam    Tablet 5 milliGRAM(s) Oral every 8 hours PRN  docusate sodium 100 milliGRAM(s) Oral three times a day  FLUoxetine 40 milliGRAM(s) Oral daily  glucagon  Injectable 1 milliGRAM(s) IntraMuscular once PRN  HYDROmorphone   Tablet 8 milliGRAM(s) Oral every 4 hours PRN  HYDROmorphone   Tablet 6 milliGRAM(s) Oral every 4 hours PRN  HYDROmorphone  Injectable 0.5 milliGRAM(s) IV Push every 4 hours PRN  influenza   Vaccine 0.5 milliLiter(s) IntraMuscular once  insulin lispro (HumaLOG) corrective regimen sliding scale   SubCutaneous Before meals and at bedtime  lamoTRIgine 200 milliGRAM(s) Oral daily  liothyronine 50 MICROGram(s) Oral daily  naloxegol 12.5 milliGRAM(s) Oral <User Schedule>  naloxone Injectable 0.1 milliGRAM(s) IV Push every 3 minutes PRN  ondansetron Injectable 4 milliGRAM(s) IV Push every 6 hours PRN  pregabalin 100 milliGRAM(s) Oral three times a day  senna 2 Tablet(s) Oral at bedtime      Lab      Radiology    Assessment- Lumbar radiculopathy    Plan [facility]

## 2018-09-28 DIAGNOSIS — R73.03 PREDIABETES: ICD-10-CM

## 2018-09-28 DIAGNOSIS — G97.41 ACCIDENTAL PUNCTURE OR LACERATION OF DURA DURING A PROCEDURE: ICD-10-CM

## 2018-09-28 DIAGNOSIS — E78.5 HYPERLIPIDEMIA, UNSPECIFIED: ICD-10-CM

## 2018-09-28 DIAGNOSIS — M51.16 INTERVERTEBRAL DISC DISORDERS WITH RADICULOPATHY, LUMBAR REGION: ICD-10-CM

## 2018-09-28 DIAGNOSIS — M48.061 SPINAL STENOSIS, LUMBAR REGION WITHOUT NEUROGENIC CLAUDICATION: ICD-10-CM

## 2018-09-28 DIAGNOSIS — M43.16 SPONDYLOLISTHESIS, LUMBAR REGION: ICD-10-CM

## 2018-09-28 DIAGNOSIS — F90.9 ATTENTION-DEFICIT HYPERACTIVITY DISORDER, UNSPECIFIED TYPE: ICD-10-CM

## 2018-09-28 DIAGNOSIS — I10 ESSENTIAL (PRIMARY) HYPERTENSION: ICD-10-CM

## 2018-09-28 DIAGNOSIS — B19.20 UNSPECIFIED VIRAL HEPATITIS C WITHOUT HEPATIC COMA: ICD-10-CM

## 2018-09-28 DIAGNOSIS — F41.8 OTHER SPECIFIED ANXIETY DISORDERS: ICD-10-CM

## 2018-10-02 ENCOUNTER — APPOINTMENT (OUTPATIENT)
Dept: ORTHOPEDIC SURGERY | Facility: CLINIC | Age: 53
End: 2018-10-02
Payer: MEDICAID

## 2018-10-02 PROCEDURE — 99024 POSTOP FOLLOW-UP VISIT: CPT

## 2018-10-04 ENCOUNTER — APPOINTMENT (OUTPATIENT)
Dept: ORTHOPEDIC SURGERY | Facility: HOSPITAL | Age: 53
End: 2018-10-04
Payer: MEDICAID

## 2018-10-04 PROCEDURE — 99024 POSTOP FOLLOW-UP VISIT: CPT

## 2018-10-05 ENCOUNTER — MOBILE ON CALL (OUTPATIENT)
Age: 53
End: 2018-10-05

## 2018-10-05 ENCOUNTER — RESULT REVIEW (OUTPATIENT)
Age: 53
End: 2018-10-05

## 2018-10-09 ENCOUNTER — APPOINTMENT (OUTPATIENT)
Dept: ORTHOPEDIC SURGERY | Facility: CLINIC | Age: 53
End: 2018-10-09
Payer: MEDICAID

## 2018-10-09 PROCEDURE — 99024 POSTOP FOLLOW-UP VISIT: CPT

## 2018-10-16 ENCOUNTER — APPOINTMENT (OUTPATIENT)
Dept: INTERNAL MEDICINE | Facility: CLINIC | Age: 53
End: 2018-10-16

## 2018-10-23 ENCOUNTER — APPOINTMENT (OUTPATIENT)
Dept: INTERNAL MEDICINE | Facility: CLINIC | Age: 53
End: 2018-10-23
Payer: MEDICAID

## 2018-10-23 VITALS
DIASTOLIC BLOOD PRESSURE: 90 MMHG | TEMPERATURE: 98.3 F | HEIGHT: 71 IN | WEIGHT: 185 LBS | HEART RATE: 91 BPM | BODY MASS INDEX: 25.9 KG/M2 | SYSTOLIC BLOOD PRESSURE: 150 MMHG | OXYGEN SATURATION: 97 %

## 2018-10-23 PROCEDURE — G0008: CPT

## 2018-10-23 PROCEDURE — 90688 IIV4 VACCINE SPLT 0.5 ML IM: CPT

## 2018-10-23 PROCEDURE — 99214 OFFICE O/P EST MOD 30 MIN: CPT | Mod: 25

## 2018-10-23 RX ORDER — TESTOSTERONE 16.2 MG/G
20.25 MG/ACT GEL TRANSDERMAL
Qty: 1 | Refills: 0 | Status: ACTIVE | COMMUNITY
Start: 2018-10-23 | End: 1900-01-01

## 2018-10-29 ENCOUNTER — FORM ENCOUNTER (OUTPATIENT)
Age: 53
End: 2018-10-29

## 2018-10-30 ENCOUNTER — APPOINTMENT (OUTPATIENT)
Dept: ORTHOPEDIC SURGERY | Facility: CLINIC | Age: 53
End: 2018-10-30
Payer: MEDICAID

## 2018-10-30 ENCOUNTER — APPOINTMENT (OUTPATIENT)
Dept: RADIOLOGY | Facility: CLINIC | Age: 53
End: 2018-10-30

## 2018-10-30 ENCOUNTER — OUTPATIENT (OUTPATIENT)
Dept: OUTPATIENT SERVICES | Facility: HOSPITAL | Age: 53
LOS: 1 days | End: 2018-10-30
Payer: MEDICAID

## 2018-10-30 DIAGNOSIS — Z98.890 OTHER SPECIFIED POSTPROCEDURAL STATES: Chronic | ICD-10-CM

## 2018-10-30 PROCEDURE — 72100 X-RAY EXAM L-S SPINE 2/3 VWS: CPT

## 2018-10-30 PROCEDURE — 72100 X-RAY EXAM L-S SPINE 2/3 VWS: CPT | Mod: 26

## 2018-10-30 PROCEDURE — 99024 POSTOP FOLLOW-UP VISIT: CPT

## 2018-11-08 LAB
ANION GAP SERPL CALC-SCNC: 14 MMOL/L
BASOPHILS # BLD AUTO: 0.05 K/UL
BASOPHILS NFR BLD AUTO: 0.4 %
BUN SERPL-MCNC: 20 MG/DL
CALCIUM SERPL-MCNC: 9.4 MG/DL
CHLORIDE SERPL-SCNC: 97 MMOL/L
CO2 SERPL-SCNC: 30 MMOL/L
CREAT SERPL-MCNC: 0.9 MG/DL
CRP SERPL-MCNC: 0.81 MG/DL
EOSINOPHIL # BLD AUTO: 0.23 K/UL
EOSINOPHIL NFR BLD AUTO: 1.9 %
ERYTHROCYTE [SEDIMENTATION RATE] IN BLOOD BY WESTERGREN METHOD: 25 MM/HR
GLUCOSE SERPL-MCNC: 98 MG/DL
HCT VFR BLD CALC: 36 %
HGB BLD-MCNC: 11.2 G/DL
IMM GRANULOCYTES NFR BLD AUTO: 0.9 %
LYMPHOCYTES # BLD AUTO: 3.16 K/UL
LYMPHOCYTES NFR BLD AUTO: 25.8 %
MAN DIFF?: NORMAL
MCHC RBC-ENTMCNC: 25.7 PG
MCHC RBC-ENTMCNC: 31.1 GM/DL
MCV RBC AUTO: 82.6 FL
MONOCYTES # BLD AUTO: 1.11 K/UL
MONOCYTES NFR BLD AUTO: 9.1 %
NEUTROPHILS # BLD AUTO: 7.59 K/UL
NEUTROPHILS NFR BLD AUTO: 61.9 %
PLATELET # BLD AUTO: 263 K/UL
POTASSIUM SERPL-SCNC: 5 MMOL/L
RBC # BLD: 4.36 M/UL
RBC # FLD: 15 %
SODIUM SERPL-SCNC: 141 MMOL/L
WBC # FLD AUTO: 12.25 K/UL

## 2018-11-26 ENCOUNTER — APPOINTMENT (OUTPATIENT)
Dept: INTERNAL MEDICINE | Facility: CLINIC | Age: 53
End: 2018-11-26

## 2018-12-03 ENCOUNTER — FORM ENCOUNTER (OUTPATIENT)
Age: 53
End: 2018-12-03

## 2018-12-04 ENCOUNTER — APPOINTMENT (OUTPATIENT)
Dept: ORTHOPEDIC SURGERY | Facility: CLINIC | Age: 53
End: 2018-12-04
Payer: MEDICAID

## 2018-12-04 ENCOUNTER — OUTPATIENT (OUTPATIENT)
Dept: OUTPATIENT SERVICES | Facility: HOSPITAL | Age: 53
LOS: 1 days | End: 2018-12-04
Payer: MEDICAID

## 2018-12-04 ENCOUNTER — APPOINTMENT (OUTPATIENT)
Dept: RADIOLOGY | Facility: CLINIC | Age: 53
End: 2018-12-04

## 2018-12-04 VITALS — WEIGHT: 185 LBS | HEIGHT: 71 IN | RESPIRATION RATE: 16 BRPM | BODY MASS INDEX: 25.9 KG/M2 | HEART RATE: 78 BPM

## 2018-12-04 DIAGNOSIS — Z98.890 OTHER SPECIFIED POSTPROCEDURAL STATES: Chronic | ICD-10-CM

## 2018-12-04 PROCEDURE — 99024 POSTOP FOLLOW-UP VISIT: CPT

## 2018-12-04 PROCEDURE — 72100 X-RAY EXAM L-S SPINE 2/3 VWS: CPT

## 2018-12-04 PROCEDURE — 72100 X-RAY EXAM L-S SPINE 2/3 VWS: CPT | Mod: 26

## 2018-12-24 PROBLEM — R22.2 MASS OF SUBCUTANEOUS TISSUE OF BACK: Status: ACTIVE | Noted: 2017-07-25

## 2019-03-04 ENCOUNTER — FORM ENCOUNTER (OUTPATIENT)
Age: 54
End: 2019-03-04

## 2019-03-05 ENCOUNTER — APPOINTMENT (OUTPATIENT)
Dept: RADIOLOGY | Facility: CLINIC | Age: 54
End: 2019-03-05
Payer: MEDICAID

## 2019-03-05 ENCOUNTER — OUTPATIENT (OUTPATIENT)
Dept: OUTPATIENT SERVICES | Facility: HOSPITAL | Age: 54
LOS: 1 days | End: 2019-03-05

## 2019-03-05 ENCOUNTER — APPOINTMENT (OUTPATIENT)
Dept: ORTHOPEDIC SURGERY | Facility: CLINIC | Age: 54
End: 2019-03-05
Payer: MEDICAID

## 2019-03-05 DIAGNOSIS — Z98.890 OTHER SPECIFIED POSTPROCEDURAL STATES: Chronic | ICD-10-CM

## 2019-03-05 PROCEDURE — 72110 X-RAY EXAM L-2 SPINE 4/>VWS: CPT | Mod: 26

## 2019-03-05 PROCEDURE — 99213 OFFICE O/P EST LOW 20 MIN: CPT

## 2019-03-06 ENCOUNTER — APPOINTMENT (OUTPATIENT)
Dept: HEART AND VASCULAR | Facility: CLINIC | Age: 54
End: 2019-03-06

## 2019-03-08 RX ORDER — DIAZEPAM 5 MG/1
5 TABLET ORAL
Qty: 30 | Refills: 0 | Status: ACTIVE | COMMUNITY
Start: 2019-03-08 | End: 1900-01-01

## 2019-03-28 NOTE — HISTORY OF PRESENT ILLNESS
[___ Months Post Op] : [unfilled] months post op [de-identified] : s/p L3-5 laminectomy, L4/5 PSF with instrumentation.  minimal back pain.  exercising with PT.   no assistive device.  Denies radicular pain.  reports relief of preop thigh pain.  Denies weakness/numbness/headaches/fevers/chills. [de-identified] : incision: swelling resolved, incision healing well.  no erythema, warmth\par 5/5 L2-S1 b/l\par SILT L2-S1 b/l\par wwp [de-identified] : XR 3/28/2019: hardware and alignment stable.\par \par XR 12/4/18: hardware and alignment stable.\par \par XR 10/30/18: L4/5 instrumentation stable, unchanged position, no evidence of loosening or failure. [de-identified] : 6 s/p L3-5 laminectomy and L4/5 PSF doing well; [de-identified] : activity recommendations discussed\par encouraged ambulation\par discussed activity limitations\par follow up in 6 months for Lumbar AP/Lat and Flex/Ex.\par all questions answered

## 2019-06-09 ENCOUNTER — FORM ENCOUNTER (OUTPATIENT)
Age: 54
End: 2019-06-09

## 2019-06-10 ENCOUNTER — APPOINTMENT (OUTPATIENT)
Dept: ORTHOPEDIC SURGERY | Facility: HOSPITAL | Age: 54
End: 2019-06-10
Payer: MEDICAID

## 2019-06-10 ENCOUNTER — OUTPATIENT (OUTPATIENT)
Dept: OUTPATIENT SERVICES | Facility: HOSPITAL | Age: 54
LOS: 1 days | End: 2019-06-10
Payer: MEDICAID

## 2019-06-10 DIAGNOSIS — Z98.890 OTHER SPECIFIED POSTPROCEDURAL STATES: Chronic | ICD-10-CM

## 2019-06-10 PROCEDURE — 72110 X-RAY EXAM L-2 SPINE 4/>VWS: CPT

## 2019-06-10 PROCEDURE — 99213 OFFICE O/P EST LOW 20 MIN: CPT

## 2019-06-10 PROCEDURE — 72110 X-RAY EXAM L-2 SPINE 4/>VWS: CPT | Mod: 26

## 2019-06-13 ENCOUNTER — APPOINTMENT (OUTPATIENT)
Dept: CT IMAGING | Facility: CLINIC | Age: 54
End: 2019-06-13
Payer: MEDICAID

## 2019-06-13 ENCOUNTER — APPOINTMENT (OUTPATIENT)
Dept: MRI IMAGING | Facility: CLINIC | Age: 54
End: 2019-06-13
Payer: MEDICAID

## 2019-06-13 ENCOUNTER — OUTPATIENT (OUTPATIENT)
Dept: OUTPATIENT SERVICES | Facility: HOSPITAL | Age: 54
LOS: 1 days | End: 2019-06-13

## 2019-06-13 DIAGNOSIS — Z98.890 OTHER SPECIFIED POSTPROCEDURAL STATES: Chronic | ICD-10-CM

## 2019-06-13 PROCEDURE — 72131 CT LUMBAR SPINE W/O DYE: CPT | Mod: 26

## 2019-06-13 PROCEDURE — 72148 MRI LUMBAR SPINE W/O DYE: CPT | Mod: 26

## 2019-06-18 ENCOUNTER — RESULT REVIEW (OUTPATIENT)
Age: 54
End: 2019-06-18

## 2019-07-07 NOTE — DISCUSSION/SUMMARY
[de-identified] : Discussed the results of the patient's history, physical exam, and imaging. Mr. Early is now 9 months s/p L3-5 laminectomy, L4/5 PSF with instrumentation. Was doing well until about 2-3 months ago. He states low back pain has worsened gradually over the past 2-3 months, most severe over the past 2-3 weeks. Pain radiates to both buttocks and to both thighs. States pain similar to preoperative pain symptoms. Physical and neurologic exams are normal. XR today shows hardware intact, good alignment. I am recommending new MRI lumbar without contrast and CT lumbar without contrast for further evaluation, to be completed this week. The patient will follow up with me after imaging is completed, sooner if there is an issue.  All questions were answered.\par \par

## 2019-07-07 NOTE — PHYSICAL EXAM
[de-identified] : General: patient is well developed, well nourished, in no acute \par distress, alert and oriented x 3. \par \par Mood and affect: normal\par \par Respiratory: no respiratory distress noted\par \par Skin: no scars over spine, skin intact, no erythema, increased warmth\par \par Alignment:The spine is well compensated in the coronal and sagittal plane.  There is no asymmetry on the simmons forward bend test\par \par Gait: The patient is able to toe walk and heel walk without difficulty. The patient is able to tandem gait without difficulty.\par \par Palpation: no tenderness to palpation spine or paraspinal region\par \par Range of motion: Lumbar spine ROM is restricted\par \par Neurologic Exam:\par Motor: Manual Muscle testing in the lower extremities is 5 out of 5 in all muscle groups. There is no evidence of muscular atrophy in the lower extremities. Sensory: Sensation to light touch is grossly intact in the lower extremities\par \par Reflexes: DTR are present and symmetric throughout, negative fuentes bilaterally, negative inverted radial reflex bilaterally, no clonus, plantar responses are flexor\par \par Hip Exam: No pain with internal or external rotation of hips bilaterally\par \par Special tests: Straight leg raise test negative.  Cross straight leg test negative.  EDISON test negative\par \par Vascular: Examination of the peripheral vascular system demonstrates no evidence of congestion or edema. no evidence of lymphedema bilateral lower extremities, pulses are present and symmetric in both lower extremities.\par \par  [de-identified] : XR 6/10/19: hardware intact, good alignment, stable from last imaging\par \par XR 3/28/2019: hardware and alignment stable.\par \par XR 12/4/18: hardware and alignment stable.\par \par XR 10/30/18: L4/5 instrumentation stable, unchanged position, no evidence of loosening or failure.

## 2019-07-07 NOTE — HISTORY OF PRESENT ILLNESS
[de-identified] : Follow Up 6/10/19: Patient states low back pain has worsened gradually over the last 2-3 months, much worse over the past 2-3 weeks. Pain radiates to his buttocks and both thighs. States current pain similar to preoperative pain. Denies lower extremity weakness, denies bowel/bladder symptoms.\par \par Follow Up 3/5/19: s/p L3-5 laminectomy, L4/5 PSF with instrumentation. minimal back pain. exercising with PT. no assistive device. Denies radicular pain. reports relief of preop thigh pain. Denies weakness/numbness/headaches/fevers/chills. \par

## 2019-08-06 ENCOUNTER — APPOINTMENT (OUTPATIENT)
Dept: ORTHOPEDIC SURGERY | Facility: CLINIC | Age: 54
End: 2019-08-06
Payer: MEDICAID

## 2019-08-06 DIAGNOSIS — Z98.890 OTHER SPECIFIED POSTPROCEDURAL STATES: ICD-10-CM

## 2019-08-06 PROCEDURE — 99213 OFFICE O/P EST LOW 20 MIN: CPT

## 2019-09-09 PROBLEM — Z98.890 S/P LUMBAR LAMINECTOMY: Status: ACTIVE | Noted: 2018-10-02

## 2019-09-10 NOTE — DISCUSSION/SUMMARY
[de-identified] : Discussed the results of the patient's history, physical exam, and imaging with patient and partner. Mr. Early is now 11 months s/p L3-5 laminectomy, L4/5 PSF with instrumentation. Was doing well until about 3-4 months ago. He states low back pain has worsened gradually over the past 3-4 months. Pain radiates to both buttocks and to both thighs. States pain similar to preoperative pain symptoms. Neurologically intact. Updated MRI/CT lumbar show L4/L5 well decompressed, interval development of L3/4 disc degeneration with disc herniation resulting in moderate/severe central canal stenosis. Explained that this may require surgical decompression in the future. I am recommending continued follow up with Dr. Gutierrez for consideration of lumbar ABBI. Patient will follow up with me 3-4 weeks after injection, sooner if there is an issue. All questions answered.

## 2019-09-10 NOTE — PHYSICAL EXAM
[de-identified] : XR 6/10/19: hardware intact, good alignment, stable from last imaging\par \par XR 3/28/2019: hardware and alignment stable.\par \par XR 12/4/18: hardware and alignment stable.\par \par XR 10/30/18: L4/5 instrumentation stable, unchanged position, no evidence of loosening or failure. \par  [de-identified] : General: patient is well developed, well nourished, in no acute \par distress, alert and oriented x 3. \par \par Mood and affect: normal\par \par Respiratory: no respiratory distress noted\par \par Skin: no scars over spine, skin intact, no erythema, increased warmth\par \par Alignment:The spine is well compensated in the coronal and sagittal plane. There is no asymmetry on the simmons forward bend test\par \par Gait: The patient is able to toe walk and heel walk without difficulty. The patient is able to tandem gait without difficulty.\par \par Palpation: no tenderness to palpation spine or paraspinal region\par \par Range of motion: Lumbar spine ROM is restricted\par \par Neurologic Exam:\par Motor: Manual Muscle testing in the lower extremities is 5 out of 5 in all muscle groups. There is no evidence of muscular atrophy in the lower extremities. Sensory: Sensation to light touch is grossly intact in the lower extremities\par \par Reflexes: DTR are present and symmetric throughout, negative fuentes bilaterally, negative inverted radial reflex bilaterally, no clonus, plantar responses are flexor\par \par Hip Exam: No pain with internal or external rotation of hips bilaterally\par \par Special tests: Straight leg raise test negative. Cross straight leg test negative. EDISON test negative\par \par Vascular: Examination of the peripheral vascular system demonstrates no evidence of congestion or edema. no evidence of lymphedema bilateral lower extremities, pulses are present and symmetric in both lower extremities.\par \par

## 2019-09-10 NOTE — HISTORY OF PRESENT ILLNESS
[de-identified] : Follow Up 8/6/19: \par \par Follow Up 6/10/19: Patient states low back pain has worsened gradually over the last 2-3 months, much worse over the past 2-3 weeks. Pain radiates to his buttocks and both thighs. States current pain similar to preoperative pain. Denies lower extremity weakness, denies bowel/bladder symptoms.\par \par Follow Up 3/5/19: s/p L3-5 laminectomy, L4/5 PSF with instrumentation. minimal back pain. exercising with PT. no assistive device. Denies radicular pain. reports relief of preop thigh pain. Denies weakness/numbness/headaches/fevers/chills.

## 2020-01-28 NOTE — DISCHARGE NOTE ADULT - FUNCTIONAL STATUS DATE
Attempted to call report, Kari SILVA to call back  
Pt back from CT  
Pt to CT via justin  
26-Sep-2018

## 2020-02-24 ENCOUNTER — APPOINTMENT (OUTPATIENT)
Dept: HEART AND VASCULAR | Facility: CLINIC | Age: 55
End: 2020-02-24
Payer: MEDICAID

## 2020-02-24 VITALS
HEART RATE: 70 BPM | HEIGHT: 71 IN | WEIGHT: 187 LBS | BODY MASS INDEX: 26.18 KG/M2 | OXYGEN SATURATION: 99 % | DIASTOLIC BLOOD PRESSURE: 90 MMHG | TEMPERATURE: 97.2 F | SYSTOLIC BLOOD PRESSURE: 130 MMHG

## 2020-02-24 DIAGNOSIS — E78.5 HYPERLIPIDEMIA, UNSPECIFIED: ICD-10-CM

## 2020-02-24 DIAGNOSIS — E78.6 LIPOPROTEIN DEFICIENCY: ICD-10-CM

## 2020-02-24 DIAGNOSIS — Z01.818 ENCOUNTER FOR OTHER PREPROCEDURAL EXAMINATION: ICD-10-CM

## 2020-02-24 DIAGNOSIS — Z00.00 ENCOUNTER FOR GENERAL ADULT MEDICAL EXAMINATION W/OUT ABNORMAL FINDINGS: ICD-10-CM

## 2020-02-24 DIAGNOSIS — R73.03 PREDIABETES.: ICD-10-CM

## 2020-02-24 PROCEDURE — 36415 COLL VENOUS BLD VENIPUNCTURE: CPT

## 2020-02-24 PROCEDURE — 93000 ELECTROCARDIOGRAM COMPLETE: CPT | Mod: NC

## 2020-02-24 PROCEDURE — 99214 OFFICE O/P EST MOD 30 MIN: CPT | Mod: 57

## 2020-02-25 PROBLEM — E78.5 DYSLIPIDEMIA: Status: RESOLVED | Noted: 2018-06-05 | Resolved: 2020-02-25

## 2020-02-25 PROBLEM — Z00.00 ANNUAL PHYSICAL EXAM: Status: ACTIVE | Noted: 2018-06-05

## 2020-02-25 LAB
25(OH)D3 SERPL-MCNC: 46.1 NG/ML
ALBUMIN SERPL ELPH-MCNC: 4.7 G/DL
ALP BLD-CCNC: 80 U/L
ALT SERPL-CCNC: 17 U/L
ANION GAP SERPL CALC-SCNC: 16 MMOL/L
APTT BLD: 33 SEC
AST SERPL-CCNC: 23 U/L
BASOPHILS # BLD AUTO: 0.07 K/UL
BASOPHILS NFR BLD AUTO: 1.1 %
BILIRUB SERPL-MCNC: 0.2 MG/DL
BUN SERPL-MCNC: 17 MG/DL
CALCIUM SERPL-MCNC: 9.1 MG/DL
CHLORIDE SERPL-SCNC: 100 MMOL/L
CHOLEST SERPL-MCNC: 164 MG/DL
CHOLEST/HDLC SERPL: 3 RATIO
CO2 SERPL-SCNC: 21 MMOL/L
CREAT SERPL-MCNC: 1 MG/DL
EOSINOPHIL # BLD AUTO: 0.15 K/UL
EOSINOPHIL NFR BLD AUTO: 2.3 %
ESTIMATED AVERAGE GLUCOSE: 111 MG/DL
GLUCOSE SERPL-MCNC: 100 MG/DL
HBA1C MFR BLD HPLC: 5.5 %
HCT VFR BLD CALC: 46.1 %
HDLC SERPL-MCNC: 54 MG/DL
HGB BLD-MCNC: 14.4 G/DL
IMM GRANULOCYTES NFR BLD AUTO: 0 %
INR PPP: 0.94 RATIO
LDLC SERPL CALC-MCNC: 96 MG/DL
LYMPHOCYTES # BLD AUTO: 2.69 K/UL
LYMPHOCYTES NFR BLD AUTO: 41.2 %
MAN DIFF?: NORMAL
MCHC RBC-ENTMCNC: 26.9 PG
MCHC RBC-ENTMCNC: 31.2 GM/DL
MCV RBC AUTO: 86.2 FL
MONOCYTES # BLD AUTO: 0.66 K/UL
MONOCYTES NFR BLD AUTO: 10.1 %
NEUTROPHILS # BLD AUTO: 2.96 K/UL
NEUTROPHILS NFR BLD AUTO: 45.3 %
PLATELET # BLD AUTO: 194 K/UL
POTASSIUM SERPL-SCNC: 4 MMOL/L
PROT SERPL-MCNC: 6.9 G/DL
PSA FREE FLD-MCNC: 10 %
PSA FREE SERPL-MCNC: 0.09 NG/ML
PSA SERPL-MCNC: 0.87 NG/ML
PT BLD: 10.7 SEC
RBC # BLD: 5.35 M/UL
RBC # FLD: 14.5 %
SODIUM SERPL-SCNC: 137 MMOL/L
TRIGL SERPL-MCNC: 69 MG/DL
TSH SERPL-ACNC: 0.94 UIU/ML
WBC # FLD AUTO: 6.53 K/UL

## 2020-02-25 NOTE — ASSESSMENT
[FreeTextEntry1] : abnormal EKG\par \par chronic lumbar pain with moderate/severe central canal stenosis L3/4 \par \par mild HTN\par \par

## 2020-02-25 NOTE — REVIEW OF SYSTEMS
[Recent Weight Gain (___ Lbs)] : recent [unfilled] ~Ulb weight gain [Feeling Fatigued] : feeling fatigued [Recent Weight Loss (___ Lbs)] : no recent weight loss [Joint Pain] : no joint pain [Joint Swelling] : no joint swelling [Joint Stiffness] : no joint stiffness [Muscle Cramps] : muscle cramps [Limb Weakness (Paresis)] : no limb weakness [Anxiety] : anxiety [Under Stress] : under stress [FreeTextEntry1] : poor sleep , low back pain  [Negative] : Heme/Lymph

## 2020-02-25 NOTE — DISCUSSION/SUMMARY
[Procedure Low Risk] : the procedure risk is low [Additional Diagnostics Recommended] : additional diagnostics recommended [FreeTextEntry1] : set up routine exercise stress tests  and echocardiogram \par \par stop all NSAIDs 14 days prior to procedure \par \par venipuncture performed

## 2020-02-25 NOTE — PHYSICAL EXAM
[Normal Appearance] : normal appearance [General Appearance - Well Developed] : well developed [Well Groomed] : well groomed [No Deformities] : no deformities [General Appearance - Well Nourished] : well nourished [Eyelids - No Xanthelasma] : the eyelids demonstrated no xanthelasmas [Normal Conjunctiva] : the conjunctiva exhibited no abnormalities [General Appearance - In No Acute Distress] : no acute distress [No Oral Pallor] : no oral pallor [No Oral Cyanosis] : no oral cyanosis [FreeTextEntry1] : no bruits  [Respiration, Rhythm And Depth] : normal respiratory rhythm and effort [Heart Rate And Rhythm] : heart rate and rhythm were normal [Auscultation Breath Sounds / Voice Sounds] : lungs were clear to auscultation bilaterally [Exaggerated Use Of Accessory Muscles For Inspiration] : no accessory muscle use [Heart Sounds] : normal S1 and S2 [Murmurs] : no murmurs present [Arterial Pulses Normal] : the arterial pulses were normal [Edema] : no peripheral edema present [Bowel Sounds] : normal bowel sounds [Veins - Varicosity Changes] : no varicosital changes were noted in the lower extremities [Abdomen Tenderness] : non-tender [Abdomen Soft] : soft [Abdomen Mass (___ Cm)] : no abdominal mass palpated [Gait - Sufficient For Exercise Testing] : the gait was sufficient for exercise testing [Abnormal Walk] : normal gait [Nail Clubbing] : no clubbing of the fingernails [Petechial Hemorrhages (___cm)] : no petechial hemorrhages [Cyanosis, Localized] : no localized cyanosis [Fingers Osler's Nodes] : Osler's nodes were not seenon the fingers [Nail Splinter Hemorrhages] : no splinter hemorrhages of the nails [Skin Turgor] : normal skin turgor [Skin Color & Pigmentation] : normal skin color and pigmentation [] : no rash [Skin Lesions] : no skin lesions [No Venous Stasis] : no venous stasis [No Skin Ulcers] : no skin ulcer [No Xanthoma] : no  xanthoma was observed [Oriented To Time, Place, And Person] : oriented to person, place, and time [Affect] : the affect was normal [Impaired Insight] : insight and judgment were intact [Mood] : the mood was normal [Memory Recent] : recent memory was not impaired [Memory Remote] : remote memory was not impaired

## 2020-02-25 NOTE — HISTORY OF PRESENT ILLNESS
[Date of Surgery ___] : on [unfilled] [Preoperative Visit] : for a medical evaluation prior to surgery [Scheduled Procedure ___] : a [unfilled] [Stable] : Stable [Fever] : no fever [Chills] : no chills [Fatigue] : no fatigue [Chest Pain] : no chest pain [Cough] : no cough [Dysuria] : no dysuria [Dyspnea] : no dyspnea [Urinary Frequency] : no urinary frequency [Nausea] : no nausea [Vomiting] : no vomiting [Diarrhea] : no diarrhea [Abdominal Pain] : no abdominal pain [Lower Extremity Swelling] : no lower extremity swelling [Easy Bruising] : no easy bruising [Poor Exercise Tolerance] : no poor exercise tolerance [Cardiovascular Disease] : no cardiovascular disease [Pulmonary Disease] : no pulmonary disease [Diabetes] : no diabetes [Nicotine Dependence] : no nicotine dependence [Anti-Platelet Agents] : no anti-platelet agents [Renal Disease] : no renal disease [Alcohol Use] : no  alcohol use [GI Disease] : no gastrointestinal disease [Sleep Apnea] : no sleep apnea [Thromboembolic Problems] : no thromboembolic problems [Frequent use of NSAIDs] : no use of NSAIDs [Steroid Use in Last 6 Months] : no steroid use in the last six months [Transfusion Reaction] : no transfusion reaction [Impaired Immunity] : no impaired immunity [Prior Anesthesia] : Prior anesthesia [Frequent Aspirin Use] : no frequent aspirin use [Prev Anesthesia Reaction] : no previous anesthesia reaction [Electrocardiogram] : ~T an ECG ~C was performed [Echocardiogram] : ~T an echocardiogram ~C was performed [Anesthesia Reaction] : no anesthesia reaction [Unable to Assess] : Unable to Assess [Cardiovascular Stress Test] : a cardiac stress test ~T ~C was performed [Clotting Disorder] : no clotting disorder [Sudden Death] : no sudden death [Bleeding Disorder] : no bleeding disorder [FreeTextEntry1] : 54  year old with hx of  bipolar depression  and low testosterone levels has been suffering from chronic low back pain and lumbar radiculopathy unrelieved with conservative therapy.  Spinal stenosis diagnosed on MRI  resulted in laminectomy  with PSF L4/5 3/19 .  Pain has returned  because of L3/4 spinal disk herniation with moderate/severe  central  canal stenosis . He is opting for spinal radiofrequency ablation at this time. \par \par No hx of MI, stroke, CHF , significant arrhythmias or asthma. \par \par Resting EKG  shows NSR 80 bpm without ectopy, ischemia or LVH but there are septal Q waves V 1-V 2 suspicious for possible septal MI. This pattern is chronic and unchanged. \par \par  He had a  negative stress test in 2015. \par \par He is free of chest pain, dyspnea , palpitations , syncope

## 2020-07-06 ENCOUNTER — APPOINTMENT (OUTPATIENT)
Dept: HEART AND VASCULAR | Facility: CLINIC | Age: 55
End: 2020-07-06

## 2020-08-16 ENCOUNTER — TRANSCRIPTION ENCOUNTER (OUTPATIENT)
Age: 55
End: 2020-08-16

## 2020-08-21 ENCOUNTER — TRANSCRIPTION ENCOUNTER (OUTPATIENT)
Age: 55
End: 2020-08-21

## 2020-11-06 ENCOUNTER — APPOINTMENT (OUTPATIENT)
Dept: HEART AND VASCULAR | Facility: CLINIC | Age: 55
End: 2020-11-06
Payer: MEDICAID

## 2020-11-06 ENCOUNTER — MED ADMIN CHARGE (OUTPATIENT)
Age: 55
End: 2020-11-06

## 2020-11-06 VITALS
BODY MASS INDEX: 25.06 KG/M2 | SYSTOLIC BLOOD PRESSURE: 140 MMHG | RESPIRATION RATE: 17 BRPM | DIASTOLIC BLOOD PRESSURE: 80 MMHG | TEMPERATURE: 98.1 F | HEIGHT: 71 IN | HEART RATE: 94 BPM | WEIGHT: 179 LBS | OXYGEN SATURATION: 97 %

## 2020-11-06 DIAGNOSIS — M54.42 LUMBAGO WITH SCIATICA, LEFT SIDE: ICD-10-CM

## 2020-11-06 DIAGNOSIS — G89.29 LUMBAGO WITH SCIATICA, LEFT SIDE: ICD-10-CM

## 2020-11-06 DIAGNOSIS — Z98.1 ARTHRODESIS STATUS: ICD-10-CM

## 2020-11-06 DIAGNOSIS — M54.41 LUMBAGO WITH SCIATICA, LEFT SIDE: ICD-10-CM

## 2020-11-06 DIAGNOSIS — R79.89 OTHER SPECIFIED ABNORMAL FINDINGS OF BLOOD CHEMISTRY: ICD-10-CM

## 2020-11-06 PROCEDURE — 36415 COLL VENOUS BLD VENIPUNCTURE: CPT

## 2020-11-06 PROCEDURE — 93000 ELECTROCARDIOGRAM COMPLETE: CPT

## 2020-11-06 PROCEDURE — 99072 ADDL SUPL MATRL&STAF TM PHE: CPT

## 2020-11-06 PROCEDURE — 99213 OFFICE O/P EST LOW 20 MIN: CPT

## 2020-11-09 LAB
25(OH)D3 SERPL-MCNC: 51.8 NG/ML
ALBUMIN SERPL ELPH-MCNC: 4.8 G/DL
ALP BLD-CCNC: 95 U/L
ALT SERPL-CCNC: 19 U/L
ANION GAP SERPL CALC-SCNC: 13 MMOL/L
APPEARANCE: CLEAR
AST SERPL-CCNC: 26 U/L
BASOPHILS # BLD AUTO: 0.1 K/UL
BASOPHILS NFR BLD AUTO: 1.1 %
BILIRUB SERPL-MCNC: 0.3 MG/DL
BILIRUBIN URINE: NEGATIVE
BLOOD URINE: NEGATIVE
BUN SERPL-MCNC: 16 MG/DL
CALCIUM SERPL-MCNC: 9.6 MG/DL
CHLORIDE SERPL-SCNC: 97 MMOL/L
CHOLEST SERPL-MCNC: 161 MG/DL
CO2 SERPL-SCNC: 25 MMOL/L
COLOR: YELLOW
CREAT SERPL-MCNC: 1.33 MG/DL
CREAT SPEC-SCNC: 136 MG/DL
EOSINOPHIL # BLD AUTO: 0.22 K/UL
EOSINOPHIL NFR BLD AUTO: 2.3 %
ESTIMATED AVERAGE GLUCOSE: 114 MG/DL
GLUCOSE QUALITATIVE U: NEGATIVE
GLUCOSE SERPL-MCNC: 95 MG/DL
HBA1C MFR BLD HPLC: 5.6 %
HCT VFR BLD CALC: 43.3 %
HDLC SERPL-MCNC: 54 MG/DL
HGB BLD-MCNC: 13.4 G/DL
IMM GRANULOCYTES NFR BLD AUTO: 0.1 %
KETONES URINE: NEGATIVE
LDLC SERPL CALC-MCNC: 91 MG/DL
LEUKOCYTE ESTERASE URINE: NEGATIVE
LYMPHOCYTES # BLD AUTO: 3.25 K/UL
LYMPHOCYTES NFR BLD AUTO: 34.4 %
MAN DIFF?: NORMAL
MCHC RBC-ENTMCNC: 26.6 PG
MCHC RBC-ENTMCNC: 30.9 GM/DL
MCV RBC AUTO: 86.1 FL
MICROALBUMIN 24H UR DL<=1MG/L-MCNC: 1.3 MG/DL
MICROALBUMIN/CREAT 24H UR-RTO: 9 MG/G
MONOCYTES # BLD AUTO: 0.97 K/UL
MONOCYTES NFR BLD AUTO: 10.3 %
NEUTROPHILS # BLD AUTO: 4.89 K/UL
NEUTROPHILS NFR BLD AUTO: 51.8 %
NITRITE URINE: NEGATIVE
NONHDLC SERPL-MCNC: 107 MG/DL
PH URINE: 6
PLATELET # BLD AUTO: 207 K/UL
POTASSIUM SERPL-SCNC: 4.2 MMOL/L
PROLACTIN SERPL-MCNC: 16.7 NG/ML
PROT SERPL-MCNC: 7 G/DL
PROTEIN URINE: NEGATIVE
RBC # BLD: 5.03 M/UL
RBC # FLD: 15.6 %
SARS-COV-2 IGG SERPL IA-ACNC: <0.1 INDEX
SARS-COV-2 IGG SERPL QL IA: NEGATIVE
SODIUM SERPL-SCNC: 136 MMOL/L
SPECIFIC GRAVITY URINE: 1.02
TRIGL SERPL-MCNC: 82 MG/DL
TSH SERPL-ACNC: 1.84 UIU/ML
UROBILINOGEN URINE: NORMAL
WBC # FLD AUTO: 9.44 K/UL

## 2020-11-14 LAB
TESTOST BND SERPL-MCNC: 6.6 PG/ML
TESTOST SERPL-MCNC: 538 NG/DL

## 2020-12-09 ENCOUNTER — TRANSCRIPTION ENCOUNTER (OUTPATIENT)
Age: 55
End: 2020-12-09

## 2020-12-17 PROBLEM — Z98.1 S/P LUMBAR SPINAL FUSION: Status: ACTIVE | Noted: 2018-10-02

## 2020-12-17 PROBLEM — R79.89 LOW TESTOSTERONE IN MALE: Status: ACTIVE | Noted: 2018-06-27

## 2020-12-17 NOTE — HISTORY OF PRESENT ILLNESS
[FreeTextEntry1] : refuses flu vaccine\par \par Denies fevers, chills, cough or known Covid 19 infection \par \par Main issue  is chronic low back pain \par \par Requires lab tests today\par \par EKG shows NSR 83 bpm  without ectopy, ischemia or LVH

## 2020-12-17 NOTE — DISCUSSION/SUMMARY
[___ Month(s)] : [unfilled] month(s) [With Me] : with me [FreeTextEntry1] : venipuncture performed with Covid 19 Ab, A1c, lipids, testosterone

## 2020-12-17 NOTE — REASON FOR VISIT
[Follow-Up - Clinic] : a clinic follow-up of [Hyperlipidemia] : hyperlipidemia [Hypertension] : hypertension [FreeTextEntry1] : 55  year old male with hx of  HTN , prediabetes and chronically low HDL presents for follow up . \par \par Hx of  lumbar laminectomy (1.5 months ago) .   He has repeated blood tests documenting low testosterone \par \par Denies chest pans, syncope, palpitations, dysphagia, hematuria, rectal  bleeding\par \par No fevers, chills, night sweats \par \par

## 2020-12-17 NOTE — ASSESSMENT
[FreeTextEntry1] : Mild controlled HTN\par \par hyperlipidemia\par \par \par low testosterone \par \par chronic low back pain

## 2020-12-17 NOTE — PHYSICAL EXAM
[General Appearance - Well Developed] : well developed [Normal Appearance] : normal appearance [Well Groomed] : well groomed [General Appearance - Well Nourished] : well nourished [No Deformities] : no deformities [General Appearance - In No Acute Distress] : no acute distress [Normal Conjunctiva] : the conjunctiva exhibited no abnormalities [Eyelids - No Xanthelasma] : the eyelids demonstrated no xanthelasmas [No Oral Pallor] : no oral pallor [No Oral Cyanosis] : no oral cyanosis [FreeTextEntry1] : no bruits  [Respiration, Rhythm And Depth] : normal respiratory rhythm and effort [Exaggerated Use Of Accessory Muscles For Inspiration] : no accessory muscle use [Auscultation Breath Sounds / Voice Sounds] : lungs were clear to auscultation bilaterally [Heart Rate And Rhythm] : heart rate and rhythm were normal [Heart Sounds] : normal S1 and S2 [Murmurs] : no murmurs present [Arterial Pulses Normal] : the arterial pulses were normal [Edema] : no peripheral edema present [Veins - Varicosity Changes] : no varicosital changes were noted in the lower extremities [Bowel Sounds] : normal bowel sounds [Abdomen Soft] : soft [Abdomen Tenderness] : non-tender [Abdomen Mass (___ Cm)] : no abdominal mass palpated [Abnormal Walk] : normal gait [Gait - Sufficient For Exercise Testing] : the gait was sufficient for exercise testing [Nail Clubbing] : no clubbing of the fingernails [Cyanosis, Localized] : no localized cyanosis [Petechial Hemorrhages (___cm)] : no petechial hemorrhages [Nail Splinter Hemorrhages] : no splinter hemorrhages of the nails [Fingers Osler's Nodes] : Osler's nodes were not seenon the fingers [Skin Color & Pigmentation] : normal skin color and pigmentation [Skin Turgor] : normal skin turgor [] : no rash [No Venous Stasis] : no venous stasis [Skin Lesions] : no skin lesions [No Skin Ulcers] : no skin ulcer [No Xanthoma] : no  xanthoma was observed [Oriented To Time, Place, And Person] : oriented to person, place, and time [Impaired Insight] : insight and judgment were intact [Affect] : the affect was normal [Mood] : the mood was normal [Memory Recent] : recent memory was not impaired [Memory Remote] : remote memory was not impaired

## 2020-12-17 NOTE — REVIEW OF SYSTEMS
[Recent Weight Gain (___ Lbs)] : no recent weight gain [Feeling Fatigued] : not feeling fatigued [Recent Weight Loss (___ Lbs)] : recent [unfilled] ~Ulb weight loss [Joint Pain] : no joint pain [Joint Swelling] : no joint swelling [Joint Stiffness] : no joint stiffness [Muscle Cramps] : muscle cramps [Limb Weakness (Paresis)] : no limb weakness [Anxiety] : anxiety [Under Stress] : under stress [Negative] : Heme/Lymph [FreeTextEntry1] : poor sleep , low back pain

## 2021-01-29 ENCOUNTER — APPOINTMENT (OUTPATIENT)
Dept: HEART AND VASCULAR | Facility: CLINIC | Age: 56
End: 2021-01-29
Payer: MEDICAID

## 2021-01-29 VITALS
HEART RATE: 91 BPM | DIASTOLIC BLOOD PRESSURE: 79 MMHG | HEIGHT: 71 IN | BODY MASS INDEX: 25.49 KG/M2 | SYSTOLIC BLOOD PRESSURE: 122 MMHG | TEMPERATURE: 98 F | WEIGHT: 182.06 LBS | RESPIRATION RATE: 16 BRPM | OXYGEN SATURATION: 97 %

## 2021-01-29 DIAGNOSIS — K04.7 PERIAPICAL ABSCESS W/OUT SINUS: ICD-10-CM

## 2021-01-29 PROCEDURE — 99072 ADDL SUPL MATRL&STAF TM PHE: CPT

## 2021-01-29 PROCEDURE — 36415 COLL VENOUS BLD VENIPUNCTURE: CPT

## 2021-01-29 PROCEDURE — 99213 OFFICE O/P EST LOW 20 MIN: CPT

## 2021-01-29 RX ORDER — AMOXICILLIN 500 MG/1
500 CAPSULE ORAL EVERY 8 HOURS
Qty: 42 | Refills: 0 | Status: ACTIVE | COMMUNITY
Start: 2020-12-04 | End: 1900-01-01

## 2021-01-31 LAB
ANION GAP SERPL CALC-SCNC: 16 MMOL/L
APPEARANCE: ABNORMAL
BACTERIA: NEGATIVE
BILIRUBIN URINE: NEGATIVE
BLOOD URINE: NEGATIVE
BUN SERPL-MCNC: 20 MG/DL
CALCIUM SERPL-MCNC: 9.2 MG/DL
CHLORIDE SERPL-SCNC: 100 MMOL/L
CO2 SERPL-SCNC: 22 MMOL/L
COLOR: YELLOW
CREAT SERPL-MCNC: 1.3 MG/DL
GLUCOSE QUALITATIVE U: NEGATIVE
GLUCOSE SERPL-MCNC: 88 MG/DL
HYALINE CASTS: 0 /LPF
KETONES URINE: NEGATIVE
LEUKOCYTE ESTERASE URINE: NEGATIVE
MICROSCOPIC-UA: NORMAL
NITRITE URINE: NEGATIVE
PH URINE: 6
POTASSIUM SERPL-SCNC: 4.7 MMOL/L
PROTEIN URINE: ABNORMAL
RED BLOOD CELLS URINE: 0 /HPF
SODIUM SERPL-SCNC: 138 MMOL/L
SPECIFIC GRAVITY URINE: 1.02
SQUAMOUS EPITHELIAL CELLS: 10 /HPF
URINE COMMENTS: NORMAL
UROBILINOGEN URINE: NORMAL
WHITE BLOOD CELLS URINE: 3 /HPF

## 2021-02-03 PROBLEM — K04.7 DENTAL INFECTION: Status: ACTIVE | Noted: 2020-12-04

## 2021-02-03 RX ORDER — DICLOFENAC SODIUM 75 MG/1
75 TABLET, DELAYED RELEASE ORAL
Qty: 30 | Refills: 0 | Status: DISCONTINUED | COMMUNITY
Start: 2017-07-25 | End: 2021-02-03

## 2021-02-03 RX ORDER — MELOXICAM 15 MG/1
15 TABLET ORAL DAILY
Qty: 7 | Refills: 0 | Status: DISCONTINUED | COMMUNITY
Start: 2017-06-22 | End: 2021-02-03

## 2021-02-03 NOTE — PHYSICAL EXAM
[General Appearance - Well Developed] : well developed [Normal Appearance] : normal appearance [Well Groomed] : well groomed [General Appearance - Well Nourished] : well nourished [No Deformities] : no deformities [General Appearance - In No Acute Distress] : no acute distress [Normal Conjunctiva] : the conjunctiva exhibited no abnormalities [Eyelids - No Xanthelasma] : the eyelids demonstrated no xanthelasmas [FreeTextEntry1] : no bruits  [Respiration, Rhythm And Depth] : normal respiratory rhythm and effort [Exaggerated Use Of Accessory Muscles For Inspiration] : no accessory muscle use [Auscultation Breath Sounds / Voice Sounds] : lungs were clear to auscultation bilaterally [Heart Rate And Rhythm] : heart rate and rhythm were normal [Heart Sounds] : normal S1 and S2 [Murmurs] : no murmurs present [Arterial Pulses Normal] : the arterial pulses were normal [Veins - Varicosity Changes] : no varicosital changes were noted in the lower extremities [Edema] : no peripheral edema present [Bowel Sounds] : normal bowel sounds [Abdomen Soft] : soft [Abdomen Tenderness] : non-tender [Abdomen Mass (___ Cm)] : no abdominal mass palpated [Abnormal Walk] : normal gait [Gait - Sufficient For Exercise Testing] : the gait was sufficient for exercise testing [Nail Clubbing] : no clubbing of the fingernails [Cyanosis, Localized] : no localized cyanosis [Petechial Hemorrhages (___cm)] : no petechial hemorrhages [Skin Color & Pigmentation] : normal skin color and pigmentation [Skin Turgor] : normal skin turgor [] : no rash [No Venous Stasis] : no venous stasis [No Skin Ulcers] : no skin ulcer [Skin Lesions] : no skin lesions [No Xanthoma] : no  xanthoma was observed [Oriented To Time, Place, And Person] : oriented to person, place, and time [Impaired Insight] : insight and judgment were intact [Affect] : the affect was normal [Mood] : the mood was normal [Memory Remote] : remote memory was not impaired [Memory Recent] : recent memory was not impaired

## 2021-02-03 NOTE — REASON FOR VISIT
[Follow-Up - Clinic] : a clinic follow-up of [Hypertension] : hypertension [FreeTextEntry1] : 55  year old male with hx of  HTN , prediabetes and chronically low HDL presents for follow up . \par \par Hx of  lumbar laminectomy.   He has repeated blood tests documenting low testosterone \par \par Denies chest pans, syncope, palpitations, dysphagia, hematuria, rectal  bleeding\par \par No fevers, chills, night sweats \par \par

## 2021-02-03 NOTE — HISTORY OF PRESENT ILLNESS
[FreeTextEntry1] : Recent blood tests  revealed  elevated serum   creatinine \par \par He returns for repeat serum chemistry to assess creatinine after hydration \par \par Also c/o dental pain and requests an antibiotic  as temporizing measure before he sees his dentist \par \par Denies fevers, chills, cough, dyspnea\par \par No hematuria

## 2021-02-03 NOTE — REVIEW OF SYSTEMS
[Recent Weight Gain (___ Lbs)] : recent [unfilled] ~Ulb weight gain [Feeling Fatigued] : not feeling fatigued [Recent Weight Loss (___ Lbs)] : no recent weight loss [Joint Pain] : no joint pain [Joint Swelling] : no joint swelling [Joint Stiffness] : no joint stiffness [Muscle Cramps] : muscle cramps [Limb Weakness (Paresis)] : no limb weakness [Anxiety] : anxiety [Under Stress] : under stress [Negative] : Heme/Lymph [FreeTextEntry1] : poor sleep , low back pain , dental pain

## 2021-02-03 NOTE — DISCUSSION/SUMMARY
[___ Month(s)] : [unfilled] month(s) [With Me] : with me [FreeTextEntry1] : antibiotic prescribed for dental infection \par \par venipuncture performed \par \par encouraged hydration  4 to 6 glasses water daily

## 2021-06-18 ENCOUNTER — LABORATORY RESULT (OUTPATIENT)
Age: 56
End: 2021-06-18

## 2021-06-18 ENCOUNTER — APPOINTMENT (OUTPATIENT)
Dept: HEART AND VASCULAR | Facility: CLINIC | Age: 56
End: 2021-06-18
Payer: MEDICAID

## 2021-06-18 VITALS
HEIGHT: 71 IN | TEMPERATURE: 97.1 F | BODY MASS INDEX: 25.2 KG/M2 | HEART RATE: 104 BPM | SYSTOLIC BLOOD PRESSURE: 150 MMHG | RESPIRATION RATE: 16 BRPM | DIASTOLIC BLOOD PRESSURE: 80 MMHG | OXYGEN SATURATION: 95 % | WEIGHT: 180 LBS

## 2021-06-18 DIAGNOSIS — F32.9 ANXIETY DISORDER, UNSPECIFIED: ICD-10-CM

## 2021-06-18 DIAGNOSIS — D32.9 BENIGN NEOPLASM OF MENINGES, UNSPECIFIED: ICD-10-CM

## 2021-06-18 DIAGNOSIS — F41.9 ANXIETY DISORDER, UNSPECIFIED: ICD-10-CM

## 2021-06-18 DIAGNOSIS — F90.9 ATTENTION-DEFICIT HYPERACTIVITY DISORDER, UNSPECIFIED TYPE: ICD-10-CM

## 2021-06-18 PROCEDURE — 36415 COLL VENOUS BLD VENIPUNCTURE: CPT

## 2021-06-18 PROCEDURE — 99213 OFFICE O/P EST LOW 20 MIN: CPT

## 2021-06-18 PROCEDURE — 93000 ELECTROCARDIOGRAM COMPLETE: CPT

## 2021-06-23 LAB
25(OH)D3 SERPL-MCNC: 56.5 NG/ML
ALBUMIN SERPL ELPH-MCNC: 4.8 G/DL
ALP BLD-CCNC: 104 U/L
ALT SERPL-CCNC: 22 U/L
ANION GAP SERPL CALC-SCNC: 13 MMOL/L
APPEARANCE: CLEAR
AST SERPL-CCNC: 29 U/L
BASOPHILS # BLD AUTO: 0.09 K/UL
BASOPHILS NFR BLD AUTO: 0.8 %
BILIRUB SERPL-MCNC: 0.2 MG/DL
BILIRUBIN URINE: NEGATIVE
BLOOD URINE: NEGATIVE
BUN SERPL-MCNC: 14 MG/DL
CALCIUM SERPL-MCNC: 9.3 MG/DL
CHLORIDE SERPL-SCNC: 102 MMOL/L
CHOLEST SERPL-MCNC: 169 MG/DL
CO2 SERPL-SCNC: 25 MMOL/L
COLOR: YELLOW
CREAT SERPL-MCNC: 1.08 MG/DL
CREAT SPEC-SCNC: 106 MG/DL
EOSINOPHIL # BLD AUTO: 0.16 K/UL
EOSINOPHIL NFR BLD AUTO: 1.5 %
FOLATE SERPL-MCNC: >20 NG/ML
GLUCOSE QUALITATIVE U: NEGATIVE
GLUCOSE SERPL-MCNC: 96 MG/DL
HCT VFR BLD CALC: 43.1 %
HDLC SERPL-MCNC: 57 MG/DL
HGB BLD-MCNC: 14 G/DL
IMM GRANULOCYTES NFR BLD AUTO: 0.2 %
KETONES URINE: NORMAL
LDLC SERPL CALC-MCNC: 100 MG/DL
LEUKOCYTE ESTERASE URINE: NEGATIVE
LYMPHOCYTES # BLD AUTO: 2.67 K/UL
LYMPHOCYTES NFR BLD AUTO: 24.7 %
MAN DIFF?: NORMAL
MCHC RBC-ENTMCNC: 26.6 PG
MCHC RBC-ENTMCNC: 32.5 GM/DL
MCV RBC AUTO: 81.8 FL
MICROALBUMIN 24H UR DL<=1MG/L-MCNC: 1.5 MG/DL
MICROALBUMIN/CREAT 24H UR-RTO: 14 MG/G
MONOCYTES # BLD AUTO: 0.91 K/UL
MONOCYTES NFR BLD AUTO: 8.4 %
NEUTROPHILS # BLD AUTO: 6.96 K/UL
NEUTROPHILS NFR BLD AUTO: 64.4 %
NITRITE URINE: NEGATIVE
NONHDLC SERPL-MCNC: 112 MG/DL
PH URINE: 6
PLATELET # BLD AUTO: 207 K/UL
POTASSIUM SERPL-SCNC: 4.2 MMOL/L
PROT SERPL-MCNC: 7.4 G/DL
PROTEIN URINE: NEGATIVE
RBC # BLD: 5.27 M/UL
RBC # FLD: 14.8 %
SODIUM SERPL-SCNC: 139 MMOL/L
SPECIFIC GRAVITY URINE: 1.02
TRIGL SERPL-MCNC: 58 MG/DL
TSH SERPL-ACNC: 1.41 UIU/ML
UROBILINOGEN URINE: NORMAL
VIT B12 SERPL-MCNC: 850 PG/ML
WBC # FLD AUTO: 10.81 K/UL

## 2021-07-05 ENCOUNTER — APPOINTMENT (OUTPATIENT)
Dept: MRI IMAGING | Facility: CLINIC | Age: 56
End: 2021-07-05
Payer: MEDICAID

## 2021-07-05 ENCOUNTER — OUTPATIENT (OUTPATIENT)
Dept: OUTPATIENT SERVICES | Facility: HOSPITAL | Age: 56
LOS: 1 days | End: 2021-07-05

## 2021-07-05 ENCOUNTER — RESULT REVIEW (OUTPATIENT)
Age: 56
End: 2021-07-05

## 2021-07-05 DIAGNOSIS — Z98.890 OTHER SPECIFIED POSTPROCEDURAL STATES: Chronic | ICD-10-CM

## 2021-07-05 PROCEDURE — 70553 MRI BRAIN STEM W/O & W/DYE: CPT | Mod: 26

## 2021-07-06 PROBLEM — D32.9 MENINGIOMA: Status: ACTIVE | Noted: 2021-06-18

## 2021-07-06 NOTE — DISCUSSION/SUMMARY
[FreeTextEntry1] : send for MRI of brain  +/- contrast   prior to  enrollment in treatment  trial  randomizing ECT vs ketamine

## 2021-07-06 NOTE — REASON FOR VISIT
[FreeTextEntry1] : 55  year old male with hx of  HTN , prediabetes and chronically low HDL presents for follow up . \par \par Hx of  lumbar laminectomy.   He has repeated blood tests documenting low testosterone \par \par Denies chest pans, syncope, palpitations, dysphagia, hematuria, rectal  bleeding\par \par No fevers, chills, night sweats \par \par

## 2021-07-06 NOTE — ASSESSMENT
[FreeTextEntry1] : Mild HTN today \par \par anxiety/depression   treatment resistant \par \par hx of CNS meningioma

## 2021-07-06 NOTE — ADDENDUM
[FreeTextEntry1] : MRI with and without contrast of the brain identified arachnoid cysts without abnormal enhancement and no evidence of meningioma.  \par \par Patient medically cleared for ECT therapy with appropriate anesthesia

## 2021-07-06 NOTE — HISTORY OF PRESENT ILLNESS
[FreeTextEntry1] : Enrolled in study to manage depression  will be randomized to ECT  vs ketamine for treatment of resistant depression \par \par Requesting pre trial MRI of brain to assess status of CNS meningioma \par \par s/p Pfizer covid vaccine x 2\par \par \par Not on any new medications\par \par EKG shows NSR  94 bpm with  possible LVH  by voltage  without ischemia, ectopy or pathologic Q waves     QTc 397 msec \par \par

## 2021-10-06 ENCOUNTER — LABORATORY RESULT (OUTPATIENT)
Age: 56
End: 2021-10-06

## 2021-10-06 ENCOUNTER — APPOINTMENT (OUTPATIENT)
Dept: HEART AND VASCULAR | Facility: CLINIC | Age: 56
End: 2021-10-06
Payer: MEDICAID

## 2021-10-06 VITALS
WEIGHT: 170 LBS | HEART RATE: 88 BPM | DIASTOLIC BLOOD PRESSURE: 88 MMHG | TEMPERATURE: 97.8 F | OXYGEN SATURATION: 98 % | HEIGHT: 71 IN | SYSTOLIC BLOOD PRESSURE: 134 MMHG | BODY MASS INDEX: 23.8 KG/M2

## 2021-10-06 DIAGNOSIS — Z00.00 ENCOUNTER FOR GENERAL ADULT MEDICAL EXAMINATION W/OUT ABNORMAL FINDINGS: ICD-10-CM

## 2021-10-06 DIAGNOSIS — R79.89 OTHER SPECIFIED ABNORMAL FINDINGS OF BLOOD CHEMISTRY: ICD-10-CM

## 2021-10-06 DIAGNOSIS — Z23 ENCOUNTER FOR IMMUNIZATION: ICD-10-CM

## 2021-10-06 DIAGNOSIS — I10 ESSENTIAL (PRIMARY) HYPERTENSION: ICD-10-CM

## 2021-10-06 PROCEDURE — G0008: CPT

## 2021-10-06 PROCEDURE — 93000 ELECTROCARDIOGRAM COMPLETE: CPT

## 2021-10-06 PROCEDURE — 90686 IIV4 VACC NO PRSV 0.5 ML IM: CPT

## 2021-10-06 PROCEDURE — 36415 COLL VENOUS BLD VENIPUNCTURE: CPT

## 2021-10-06 PROCEDURE — 99213 OFFICE O/P EST LOW 20 MIN: CPT | Mod: 25

## 2021-10-10 LAB
ALBUMIN SERPL ELPH-MCNC: 4.6 G/DL
ALP BLD-CCNC: 99 U/L
ALT SERPL-CCNC: 27 U/L
ANION GAP SERPL CALC-SCNC: 10 MMOL/L
APPEARANCE: ABNORMAL
AST SERPL-CCNC: 23 U/L
BASOPHILS # BLD AUTO: 0.1 K/UL
BASOPHILS NFR BLD AUTO: 1.2 %
BILIRUB SERPL-MCNC: 0.4 MG/DL
BILIRUBIN URINE: NEGATIVE
BLOOD URINE: NEGATIVE
BUN SERPL-MCNC: 14 MG/DL
CALCIUM SERPL-MCNC: 9.6 MG/DL
CHLORIDE SERPL-SCNC: 104 MMOL/L
CHOLEST SERPL-MCNC: 130 MG/DL
CO2 SERPL-SCNC: 27 MMOL/L
COLOR: YELLOW
COVID-19 NUCLEOCAPSID  GAM ANTIBODY INTERPRETATION: NEGATIVE
COVID-19 SPIKE DOMAIN ANTIBODY INTERPRETATION: POSITIVE
CREAT SERPL-MCNC: 1.04 MG/DL
EOSINOPHIL # BLD AUTO: 0.2 K/UL
EOSINOPHIL NFR BLD AUTO: 2.4 %
ESTIMATED AVERAGE GLUCOSE: 105 MG/DL
GLUCOSE QUALITATIVE U: NEGATIVE
GLUCOSE SERPL-MCNC: 85 MG/DL
HBA1C MFR BLD HPLC: 5.3 %
HCT VFR BLD CALC: 46 %
HDLC SERPL-MCNC: 47 MG/DL
HGB BLD-MCNC: 14.4 G/DL
IMM GRANULOCYTES NFR BLD AUTO: 0.1 %
KETONES URINE: NEGATIVE
LDLC SERPL CALC-MCNC: 69 MG/DL
LEUKOCYTE ESTERASE URINE: NEGATIVE
LYMPHOCYTES # BLD AUTO: 2.84 K/UL
LYMPHOCYTES NFR BLD AUTO: 34.5 %
MAN DIFF?: NORMAL
MCHC RBC-ENTMCNC: 27 PG
MCHC RBC-ENTMCNC: 31.3 GM/DL
MCV RBC AUTO: 86.1 FL
MONOCYTES # BLD AUTO: 0.85 K/UL
MONOCYTES NFR BLD AUTO: 10.3 %
NEUTROPHILS # BLD AUTO: 4.23 K/UL
NEUTROPHILS NFR BLD AUTO: 51.5 %
NITRITE URINE: NEGATIVE
NONHDLC SERPL-MCNC: 82 MG/DL
PH URINE: 7.5
PLATELET # BLD AUTO: 204 K/UL
POTASSIUM SERPL-SCNC: 4.6 MMOL/L
PROT SERPL-MCNC: 6.8 G/DL
PROTEIN URINE: NORMAL
PSA SERPL-MCNC: 1.46 NG/ML
RBC # BLD: 5.34 M/UL
RBC # FLD: 15.3 %
SARS-COV-2 AB SERPL IA-ACNC: 223 U/ML
SARS-COV-2 AB SERPL QL IA: 0.17 INDEX
SODIUM SERPL-SCNC: 142 MMOL/L
SPECIFIC GRAVITY URINE: 1.02
TRIGL SERPL-MCNC: 66 MG/DL
TSH SERPL-ACNC: 0.48 UIU/ML
UROBILINOGEN URINE: NORMAL
WBC # FLD AUTO: 8.23 K/UL

## 2021-10-17 NOTE — PHYSICAL EXAM
[Well Developed] : well developed [Well Nourished] : well nourished [No Acute Distress] : no acute distress [Normal Conjunctiva] : normal conjunctiva [No Xanthelasma] : no xanthelasma [Normal Venous Pressure] : normal venous pressure [No Carotid Bruit] : no carotid bruit [Normal S1, S2] : normal S1, S2 [No Murmur] : no murmur [No Rub] : no rub [No Gallop] : no gallop [Clear Lung Fields] : clear lung fields [Good Air Entry] : good air entry [No Respiratory Distress] : no respiratory distress  [Soft] : abdomen soft [Non Tender] : non-tender [No Masses/organomegaly] : no masses/organomegaly [Normal Gait] : normal gait [Normal Bowel Sounds] : normal bowel sounds [Gait - Sufficient for Exercise Testing] : gait - sufficient for exercise testing [No Edema] : no edema [No Cyanosis] : no cyanosis [No Clubbing] : no clubbing [No Varicosities] : no varicosities [No Rash] : no rash [No Skin Lesions] : no skin lesions [Moves all extremities] : moves all extremities [No Focal Deficits] : no focal deficits [Normal Speech] : normal speech [Alert and Oriented] : alert and oriented [Normal memory] : normal memory [de-identified] : anicteric

## 2021-10-17 NOTE — REVIEW OF SYSTEMS
[Weight Loss (___ Lbs)] : [unfilled] ~Ulb weight loss [Depression] : depression [Anxiety] : anxiety [Negative] : Heme/Lymph

## 2021-10-17 NOTE — REASON FOR VISIT
[Symptom and Test Evaluation] : symptom and test evaluation [Spouse] : spouse [FreeTextEntry1] : 55  year old male with hx of  HTN , prediabetes and chronically low HDL presents for follow up . \par \par Hx of  lumbar laminectomy.   He has repeated blood tests documenting low testosterone \par \par \par \par

## 2021-10-17 NOTE — DISCUSSION/SUMMARY
[With Me] : with me [___ Month(s)] : in [unfilled] month(s) [FreeTextEntry1] : venipuncture  with Covid Ab, A1c, lipids , PSA, etc \par \par Fluzone quadrivalent   administered \par \par Proceed  with Pfizer booster in   December

## 2021-12-02 ENCOUNTER — APPOINTMENT (OUTPATIENT)
Dept: OTOLARYNGOLOGY | Facility: CLINIC | Age: 56
End: 2021-12-02

## 2021-12-06 ENCOUNTER — APPOINTMENT (OUTPATIENT)
Dept: OTOLARYNGOLOGY | Facility: CLINIC | Age: 56
End: 2021-12-06
Payer: MEDICAID

## 2021-12-06 ENCOUNTER — NON-APPOINTMENT (OUTPATIENT)
Age: 56
End: 2021-12-06

## 2021-12-06 VITALS
WEIGHT: 180 LBS | HEART RATE: 88 BPM | SYSTOLIC BLOOD PRESSURE: 146 MMHG | OXYGEN SATURATION: 95 % | TEMPERATURE: 97.9 F | HEIGHT: 71 IN | DIASTOLIC BLOOD PRESSURE: 96 MMHG | BODY MASS INDEX: 25.2 KG/M2

## 2021-12-06 PROCEDURE — 99203 OFFICE O/P NEW LOW 30 MIN: CPT | Mod: 25

## 2021-12-06 PROCEDURE — 69210 REMOVE IMPACTED EAR WAX UNI: CPT

## 2021-12-06 RX ORDER — FLUTICASONE PROPIONATE 50 UG/1
50 SPRAY, METERED NASAL
Qty: 1 | Refills: 5 | Status: ACTIVE | COMMUNITY
Start: 2021-12-06 | End: 1900-01-01

## 2021-12-06 NOTE — ASSESSMENT
[FreeTextEntry1] : Discussed the importance not putting qtips or other foreign objects in the ears. RTC for scheduled audio when able\par \par Discussed allergen mitigation and provided the patient with the corresponding educational handout; reviewed proper nasal steroid administration technique. Discussed possible surgery for his nose; trial fluticasone

## 2021-12-06 NOTE — HISTORY OF PRESENT ILLNESS
[de-identified] : Hx of cerumen impactions & now feels clogged up more on the R. Not sure about some possible baseline hearing loss but no tinnitus. Denies: vertigo, ear pain, drainage, frequent loud noise exp/shooting, frequent infections, hx of ear surgery or IV antibiotics/chemo; denies a FHx of hearing loss. Qtip use: yes\par Also c/o chronic diff breathing through his L>>R nose since a childhood nasal injury. \par Year-round allergies to dust, pollen and others; no meds for this now.

## 2021-12-06 NOTE — CONSULT LETTER
[Dear  ___] : Dear  [unfilled], [Consult Letter:] : I had the pleasure of evaluating your patient, [unfilled]. [Please see my note below.] : Please see my note below. [Consult Closing:] : Thank you very much for allowing me to participate in the care of this patient.  If you have any questions, please do not hesitate to contact me. [Sincerely,] : Sincerely, [FreeTextEntry3] : ANTONINO Glass Jr, MD, FAAOHNS\par Otolaryngologist\par [facility]

## 2021-12-06 NOTE — PHYSICAL EXAM
[Binocular Microscopic Exam] : Binocular microscopic exam was performed [FreeTextEntry8] : deep cerumen impaction removed with suction after pretreatment w/ H2O2 [FreeTextEntry9] : deep cerumen impaction removed with suction after pretreatment w/ H2O2 [] : septum deviated to the left [de-identified] : mild L midvault pinching [de-identified] : poor dentition  [Normal] : assessment of respiratory effort is normal

## 2021-12-14 ENCOUNTER — APPOINTMENT (OUTPATIENT)
Dept: OTOLARYNGOLOGY | Facility: CLINIC | Age: 56
End: 2021-12-14

## 2022-03-02 ENCOUNTER — APPOINTMENT (OUTPATIENT)
Dept: HEART AND VASCULAR | Facility: CLINIC | Age: 57
End: 2022-03-02
Payer: MEDICAID

## 2022-03-02 VITALS
HEART RATE: 87 BPM | SYSTOLIC BLOOD PRESSURE: 150 MMHG | BODY MASS INDEX: 24.78 KG/M2 | OXYGEN SATURATION: 97 % | TEMPERATURE: 98 F | DIASTOLIC BLOOD PRESSURE: 92 MMHG | HEIGHT: 71 IN | WEIGHT: 177 LBS

## 2022-03-02 DIAGNOSIS — Z86.16 PERSONAL HISTORY OF COVID-19: ICD-10-CM

## 2022-03-02 DIAGNOSIS — I51.7 CARDIOMEGALY: ICD-10-CM

## 2022-03-02 PROCEDURE — 99213 OFFICE O/P EST LOW 20 MIN: CPT

## 2022-03-02 PROCEDURE — 93000 ELECTROCARDIOGRAM COMPLETE: CPT

## 2022-03-02 RX ORDER — TOPIRAMATE 100 MG/1
100 TABLET, FILM COATED ORAL
Qty: 30 | Refills: 0 | Status: DISCONTINUED | COMMUNITY
Start: 2017-02-22 | End: 2022-03-02

## 2022-03-02 RX ORDER — DEXTROAMPHETAMINE SULFATE 30 MG/1
30 TABLET ORAL DAILY
Qty: 60 | Refills: 0 | Status: ACTIVE | COMMUNITY
Start: 2017-04-19

## 2022-03-02 RX ORDER — LAMOTRIGINE 200 MG/1
200 TABLET ORAL
Qty: 30 | Refills: 0 | Status: DISCONTINUED | COMMUNITY
Start: 2017-01-25 | End: 2022-03-02

## 2022-03-06 PROBLEM — I51.7 LVH (LEFT VENTRICULAR HYPERTROPHY): Status: ACTIVE | Noted: 2022-03-06

## 2022-03-06 PROBLEM — Z86.16 HISTORY OF COVID-19: Status: ACTIVE | Noted: 2022-03-06

## 2022-03-06 NOTE — HISTORY OF PRESENT ILLNESS
[FreeTextEntry1] : Received ECT for depression\par \par Reports covid infection in late December 2021, uncomplicated\par \par Was fully vaccinated and boosted\par \par ECT treatments are once monthly\par \par EKG shows NSR 77 bpm with left atrial enlargement  and possible LVH by voltage  without ectopy, ischemia \par QTc 396 msec \par \par No cough, fevers, chest pain, syncope \par \par \par Takes meloxicam for joint pain management \par \par Last stress test  was in 2015

## 2022-03-06 NOTE — REASON FOR VISIT
[Symptom and Test Evaluation] : symptom and test evaluation [Hypertension] : hypertension [FreeTextEntry1] : 56  year old male with hx of  HTN , prediabetes and chronically low HDL presents for follow up . \par \par Hx of  lumbar laminectomy.   He has repeated blood tests documenting low testosterone \par \par \par \par

## 2022-03-06 NOTE — PHYSICAL EXAM
[Well Developed] : well developed [Well Nourished] : well nourished [No Acute Distress] : no acute distress [Normal Conjunctiva] : normal conjunctiva [No Xanthelasma] : no xanthelasma [Normal Venous Pressure] : normal venous pressure [No Carotid Bruit] : no carotid bruit [Normal S1, S2] : normal S1, S2 [No Murmur] : no murmur [No Rub] : no rub [No Gallop] : no gallop [Clear Lung Fields] : clear lung fields [Good Air Entry] : good air entry [No Respiratory Distress] : no respiratory distress  [Soft] : abdomen soft [Non Tender] : non-tender [No Masses/organomegaly] : no masses/organomegaly [Normal Bowel Sounds] : normal bowel sounds [Normal Gait] : normal gait [Gait - Sufficient for Exercise Testing] : gait - sufficient for exercise testing [No Edema] : no edema [No Cyanosis] : no cyanosis [No Clubbing] : no clubbing [No Varicosities] : no varicosities [No Rash] : no rash [No Skin Lesions] : no skin lesions [Moves all extremities] : moves all extremities [No Focal Deficits] : no focal deficits [Normal Speech] : normal speech [Alert and Oriented] : alert and oriented [Normal memory] : normal memory [de-identified] : anicteric

## 2022-03-06 NOTE — DISCUSSION/SUMMARY
[Left Ventricular Hypertrophy] : left ventricular hypertrophy [Essential Hypertension] : essential hypertension [Outpatient Evaluation] : outpatient evaluation [Echocardiogram] : echocardiogram [Low Sodium Diet] : low sodium diet [NSAIDs Avoidance] : non-steroidal anti-inflammatory drugs avoidance [With Me] : with me [___ Month(s)] : in [unfilled] month(s) [FreeTextEntry1] : decrease sodium intake ,  avoid NSAIDS,  use Tylenol instead\par \par set up echocardiogram  and stress test

## 2022-03-06 NOTE — REVIEW OF SYSTEMS
[Weight Loss (___ Lbs)] : [unfilled] ~Ulb weight loss [Joint Pain] : joint pain [Depression] : depression [Anxiety] : anxiety [Negative] : Heme/Lymph

## 2022-05-15 ENCOUNTER — RX RENEWAL (OUTPATIENT)
Age: 57
End: 2022-05-15

## 2022-05-15 RX ORDER — LOSARTAN POTASSIUM 50 MG/1
50 TABLET, FILM COATED ORAL DAILY
Qty: 90 | Refills: 3 | Status: ACTIVE | COMMUNITY
Start: 2021-07-20 | End: 1900-01-01

## 2022-06-15 ENCOUNTER — APPOINTMENT (OUTPATIENT)
Dept: HEART AND VASCULAR | Facility: CLINIC | Age: 57
End: 2022-06-15
Payer: MEDICAID

## 2022-06-15 DIAGNOSIS — Z79.899 OTHER LONG TERM (CURRENT) DRUG THERAPY: ICD-10-CM

## 2022-06-15 DIAGNOSIS — I51.7 CARDIOMEGALY: ICD-10-CM

## 2022-06-15 DIAGNOSIS — I10 ESSENTIAL (PRIMARY) HYPERTENSION: ICD-10-CM

## 2022-06-15 PROCEDURE — 93306 TTE W/DOPPLER COMPLETE: CPT

## 2022-06-15 PROCEDURE — 36415 COLL VENOUS BLD VENIPUNCTURE: CPT

## 2022-06-19 LAB
ALBUMIN SERPL ELPH-MCNC: 4.6 G/DL
ALP BLD-CCNC: 96 U/L
ALT SERPL-CCNC: 20 U/L
ANION GAP SERPL CALC-SCNC: 12 MMOL/L
AST SERPL-CCNC: 23 U/L
BILIRUB SERPL-MCNC: 0.2 MG/DL
BUN SERPL-MCNC: 16 MG/DL
CALCIUM SERPL-MCNC: 9.6 MG/DL
CHLORIDE SERPL-SCNC: 98 MMOL/L
CO2 SERPL-SCNC: 29 MMOL/L
CREAT SERPL-MCNC: 1.01 MG/DL
EGFR: 88 ML/MIN/1.73M2
ESTIMATED AVERAGE GLUCOSE: 123 MG/DL
GLUCOSE SERPL-MCNC: 86 MG/DL
HBA1C MFR BLD HPLC: 5.9 %
LITHIUM SERPL-SCNC: <0.2 MMOL/L
POTASSIUM SERPL-SCNC: 4.7 MMOL/L
PROT SERPL-MCNC: 7.5 G/DL
SODIUM SERPL-SCNC: 138 MMOL/L

## 2022-06-26 PROBLEM — Z79.899 LITHIUM USE: Status: ACTIVE | Noted: 2022-06-26

## 2022-11-01 ENCOUNTER — APPOINTMENT (OUTPATIENT)
Dept: OTOLARYNGOLOGY | Facility: CLINIC | Age: 57
End: 2022-11-01

## 2022-11-01 VITALS
BODY MASS INDEX: 25.65 KG/M2 | TEMPERATURE: 98.1 F | SYSTOLIC BLOOD PRESSURE: 138 MMHG | OXYGEN SATURATION: 96 % | HEART RATE: 85 BPM | HEIGHT: 71 IN | DIASTOLIC BLOOD PRESSURE: 91 MMHG | WEIGHT: 183.19 LBS

## 2022-11-01 DIAGNOSIS — J34.2 DEVIATED NASAL SEPTUM: ICD-10-CM

## 2022-11-01 DIAGNOSIS — H91.93 UNSPECIFIED HEARING LOSS, BILATERAL: ICD-10-CM

## 2022-11-01 DIAGNOSIS — J30.2 OTHER ALLERGIC RHINITIS: ICD-10-CM

## 2022-11-01 DIAGNOSIS — H61.23 IMPACTED CERUMEN, BILATERAL: ICD-10-CM

## 2022-11-01 DIAGNOSIS — J30.89 OTHER ALLERGIC RHINITIS: ICD-10-CM

## 2022-11-01 PROCEDURE — 69210 REMOVE IMPACTED EAR WAX UNI: CPT

## 2022-11-01 PROCEDURE — 99213 OFFICE O/P EST LOW 20 MIN: CPT | Mod: 25

## 2022-11-01 NOTE — PHYSICAL EXAM
[] : septum deviated to the left [Binocular Microscopic Exam] : Binocular microscopic exam was performed [Normal] : the left middle ear was normal [de-identified] : mild L midvault pinching [de-identified] : poor dentition  [FreeTextEntry8] : cerumen impaction removed with a hook [FreeTextEntry9] : cerumen impaction removed with a hook

## 2022-11-01 NOTE — ASSESSMENT
[FreeTextEntry1] : RTC when able for an audio & 6 months for a cleaning. \par \par Reinforced behavioral modification as previously discussed.\par

## 2022-11-01 NOTE — HISTORY OF PRESENT ILLNESS
[de-identified] : Hx of cerumen impactions & now feels clogged up more on the R. Maybe some baseline hearing loss but no tinnitus. Denies: vertigo, ear pain, drainage, frequent loud noise exp/shooting, frequent infections, hx of ear surgery or IV antibiotics/chemo; denies a FHx of hearing loss. Qtip use: he stopped\par Also c/o chronic diff breathing through his L>>R nose since a childhood nasal injury- this is not too bothersome. \par Year-round allergies to dust, pollen and others; no meds required for this now.

## 2023-09-15 ENCOUNTER — APPOINTMENT (OUTPATIENT)
Dept: HEART AND VASCULAR | Facility: CLINIC | Age: 58
End: 2023-09-15

## 2024-01-09 NOTE — HISTORY OF PRESENT ILLNESS
"INFECTIOUS DISEASES CONSULTATION NOTE      Referred by KELLY Weiner, DO    Reason For Consult  Gram positive bacteremia    History Of Present Illness  Batsheva Page is a 50 y.o. female well-known to me, as I have been closely involved in her care with recurrent episodes of MRSA septicemia.  She has chronic renal failure, previously had life-threatening peritonitis when she was on CAPD, has no available site for placement of an AV fistula, and has had recurrent episodes of MRSA bacteremia associated with infected hemodialysis catheters.  In addition, in September 2022 she had infectious endocarditis and underwent aortic and mitral valve replacements at [facility].  Since that time I been involved in her care twice for recurrent MRSA bacteremia, June 2023 in September 2023 in this hospital.  During the September 2023 admission, Dr. Skaggs performed a LAURA which demonstrated a new vegetation on the mitral valve.  Patient categorically refused any consideration for repeat cardiac valve surgery.  We are able to achieve bloodstream sterility with a combination of vancomycin and rifampin.  She tolerated rifampin after an initial period of pruritus, without any elevation of liver enzymes.  She completed 8 weeks of that regimen and the plan was for her to follow-up with me, and to consider a long-term suppressive prophylactic regimen, perhaps a \"vancomycin \"lock.\"  For various logistical reasons regarding her transportation, she was never able to keep several scheduled follow-up appointments with me, and she stopped taking rifampin several weeks ago.    Yesterday, when she went for routine dialysis, she was told that her catheter seemed to be \"coming out,\" even though the position did not appear any different to her from the previous months.  She had no systemic symptoms.  She was sent to the emergency room and I spoke briefly with Dr. Weiner yesterday.  Blood cultures were collected but " "she was afebrile and there was no strong clinical suspicion for bacteremia.  However, she subsequently developed chills and feels like \"it is coming back again.\"  She has no other specific localizing symptoms, other than some slight discomfort around the chest catheter that developed today.  Indeed, today the laboratory reported gram-positive cocci growing in 2 blood cultures drawn peripherally 1 hour apart.  She was given a dose of vancomycin, she underwent dialysis today, and this consultation was requested.     Past Medical History  See my previous consultation, September 2023 in this hospital    Surgical History  See my previous consultation, September 2023 in this hospital     Social History  See my previous consultation, September 2023 in this hospital    Family History  See my previous consultation, September 2023 in this hospital    Allergies  Kayexalate, Metoclopramide hcl, Prochlorperazine, and Ondansetron     Review of Systems  Detailed review of systems completed.  No significant additional positives beyond what is mentioned above    Physical Exam  Vital signs:  Visit Vitals  /54   Pulse 69   Temp 35.6 °C (96.1 °F) (Temporal)   Resp 16      General: Afebrile, not toxic, no acute distress  HEENT:  No scleral icterus or conjunctival suffusion, oral mucosa moist  Nodes:  Negative  Lungs:  Clear to auscultation  Chest: Tunneled dialysis catheter in the left chest without any erythema or tenderness  Heart:  S1, S2 normal.  Pansystolic murmur in the left precordium.  No diastolic murmur appreciated  Abdomen:  Soft, nontender. No palpable organs or masses  Back:  No spinal or CVA tenderness  Genitalia:  Not examined  Extremities:  No cords, phlebitis, cellulitis  Neurologic:  Alert.  Grossly non-focal.  No meningismus  Skin and mucosa: No peripheral signs of infectious endocarditis    Relevant Results  Results from last 72 hours   Lab Units 01/09/24  0620 01/08/24  0654   WBC AUTO x10*3/uL 6.7 7.3 "   HEMOGLOBIN g/dL 7.4* 7.7*   HEMATOCRIT % 24.5* 26.2*   PLATELETS AUTO x10*3/uL 211 240   NEUTROS PCT AUTO %  --  77.9   LYMPHS PCT AUTO %  --  11.4   MONOS PCT AUTO %  --  5.7   EOS PCT AUTO %  --  4.4     Results from last 72 hours   Lab Units 01/09/24  0620   CREATININE mg/dL 5.90*   ANION GAP mmol/L 15   EGFR mL/min/1.73m*2 8*     Results from last 72 hours   Lab Units 01/08/24  0654   AST U/L 14   ALT U/L 6   ALK PHOS U/L 70   BILIRUBIN TOTAL mg/dL 0.3     Microbiology:  Blood (1/8, peripheral): GPC clusters X2    Imaging:  CXR images personally reviewed: No pulmonary infiltrate.  Dialysis catheter in appropriate position      ASSESSMENT:  Gram-positive bacteremia  Patient is admitted with a concern about catheter placement and dysfunction, and is found to have gram-positive cocci in blood cultures drawn peripherally.  Awaiting identification of that organism, we must maintain a high degree of suspicion for recurrent MRSA bacteremia and a relapse of prosthetic valve endocarditis.  She is hemodynamically stable, no heart failure on physical examination or chest x-ray, no embolic phenomena, and no new significant murmur.    Prosthetic mitral valve endocarditis, September 2023  See above.  Rule out recurrence    Chronic renal failure  Patient is running out of viable options for placement of new dialysis catheters, and placement of an AV fistula is not a possibility.  Discussed in detail with Dr. LIBIA Pedersen      PLANS:  -   Vancomycin on board  -   Await identification of organism from blood cultures 1/8  -   Repeat peripheral blood cultures X2 today  -   If the organism is, as expected, identified again as MRSA, will ask Dr. Skaggs to repeat LAURA, and will add rifampin 300 mg every 8 hours  -   I have discussed with the patient and with Dr. Pedersen re-exploration of the possibility of switching to peritoneal dialysis  -   If this proves to be another episode of MRSA bacteremia, and peritoneal dialysis is not an option,  will again recommend completion of a prolonged course of appropriate antibiotic therapy followed by a continuing suppressive regimen    Discussed with Dr. Pedersen  Discussed with Dr. Weiner     THANK YOU FOR ASKING ME TO ASSIST YOU AGAIN IN THE CARE OF YOUR PATIENT    Adama Soto MD  [facility]  Office:  [phone]       [FreeTextEntry1] :  hx of  bipolar depression  and low testosterone levels has been suffering from chronic low back pain and lumbar radiculopathy unrelieved with conservative therapy.\par \par No hx of MI, stroke, CHF , significant arrhythmias or asthma. \par \par Resting EKG  shows NSR 79  bpm without ectopy, ischemia or LVH but there are septal Q waves V 1-V 2 suspicious for possible septal MI.  Left atrial enlargement  . This pattern is chronic and unchanged. \par \par  He had a  negative stress test in 2015. \par \par \par Requires  flu vaccine today\par \par Completed Pfizer covid vaccine series in April \par \par Had 9  ECT treatments  on lamictal  and as a results, did not notice significant clinical improvement in mood\par \par

## 2024-08-27 NOTE — PHYSICAL THERAPY INITIAL EVALUATION ADULT - LIVES WITH, PROFILE
Patient to the floor ambulatory for her IV Invanz. Vital signs  taken. No distress noted. Call light within reach.    spouse

## 2025-01-06 ENCOUNTER — APPOINTMENT (OUTPATIENT)
Dept: OTOLARYNGOLOGY | Facility: CLINIC | Age: 60
End: 2025-01-06
Payer: MEDICAID

## 2025-01-06 ENCOUNTER — NON-APPOINTMENT (OUTPATIENT)
Age: 60
End: 2025-01-06

## 2025-01-06 VITALS
HEART RATE: 89 BPM | OXYGEN SATURATION: 96 % | BODY MASS INDEX: 25.62 KG/M2 | WEIGHT: 183 LBS | DIASTOLIC BLOOD PRESSURE: 97 MMHG | TEMPERATURE: 100 F | HEIGHT: 71 IN | SYSTOLIC BLOOD PRESSURE: 183 MMHG

## 2025-01-06 DIAGNOSIS — H61.23 IMPACTED CERUMEN, BILATERAL: ICD-10-CM

## 2025-01-06 DIAGNOSIS — J34.2 DEVIATED NASAL SEPTUM: ICD-10-CM

## 2025-01-06 DIAGNOSIS — K11.7 DISTURBANCES OF SALIVARY SECRETION: ICD-10-CM

## 2025-01-06 DIAGNOSIS — J30.2 OTHER ALLERGIC RHINITIS: ICD-10-CM

## 2025-01-06 DIAGNOSIS — R53.82 CHRONIC FATIGUE, UNSPECIFIED: ICD-10-CM

## 2025-01-06 DIAGNOSIS — J30.89 OTHER ALLERGIC RHINITIS: ICD-10-CM

## 2025-01-06 DIAGNOSIS — H91.93 UNSPECIFIED HEARING LOSS, BILATERAL: ICD-10-CM

## 2025-01-06 DIAGNOSIS — R53.81 CHRONIC FATIGUE, UNSPECIFIED: ICD-10-CM

## 2025-01-06 PROCEDURE — 69210 REMOVE IMPACTED EAR WAX UNI: CPT

## 2025-01-06 PROCEDURE — 99214 OFFICE O/P EST MOD 30 MIN: CPT | Mod: 25

## 2025-01-07 LAB
ALBUMIN SERPL ELPH-MCNC: 4.6 G/DL
ALP BLD-CCNC: 82 U/L
ALT SERPL-CCNC: 19 U/L
ANA SER IF-ACNC: NEGATIVE
ANION GAP SERPL CALC-SCNC: 14 MMOL/L
AST SERPL-CCNC: 27 U/L
BILIRUB SERPL-MCNC: 0.3 MG/DL
BUN SERPL-MCNC: 13 MG/DL
CALCIUM SERPL-MCNC: 9.1 MG/DL
CHLORIDE SERPL-SCNC: 101 MMOL/L
CO2 SERPL-SCNC: 26 MMOL/L
CREAT SERPL-MCNC: 0.94 MG/DL
EGFR: 94 ML/MIN/1.73M2
GLUCOSE SERPL-MCNC: 88 MG/DL
HCT VFR BLD CALC: 44.9 %
HGB BLD-MCNC: 14.4 G/DL
MCHC RBC-ENTMCNC: 26.4 PG
MCHC RBC-ENTMCNC: 32.1 G/DL
MCV RBC AUTO: 82.2 FL
PLATELET # BLD AUTO: 224 K/UL
POTASSIUM SERPL-SCNC: 4.6 MMOL/L
PROT SERPL-MCNC: 6.8 G/DL
RBC # BLD: 5.46 M/UL
RBC # FLD: 15.2 %
SODIUM SERPL-SCNC: 141 MMOL/L
TSH SERPL-ACNC: 4.23 UIU/ML
WBC # FLD AUTO: 9.65 K/UL

## 2025-01-08 LAB
ENA SS-A AB SER IA-ACNC: <0.2 AL
ENA SS-B AB SER IA-ACNC: <0.2 AL